# Patient Record
Sex: MALE | Race: WHITE | NOT HISPANIC OR LATINO | Employment: OTHER | RURAL
[De-identification: names, ages, dates, MRNs, and addresses within clinical notes are randomized per-mention and may not be internally consistent; named-entity substitution may affect disease eponyms.]

---

## 2021-05-09 ENCOUNTER — OFFICE VISIT (OUTPATIENT)
Dept: FAMILY MEDICINE | Facility: CLINIC | Age: 56
End: 2021-05-09
Payer: COMMERCIAL

## 2021-05-09 VITALS
BODY MASS INDEX: 31.08 KG/M2 | OXYGEN SATURATION: 98 % | HEIGHT: 69 IN | DIASTOLIC BLOOD PRESSURE: 79 MMHG | RESPIRATION RATE: 17 BRPM | HEART RATE: 60 BPM | TEMPERATURE: 97 F | WEIGHT: 209.81 LBS | SYSTOLIC BLOOD PRESSURE: 119 MMHG

## 2021-05-09 DIAGNOSIS — Z80.0 FAMILY HX OF COLON CANCER: ICD-10-CM

## 2021-05-09 DIAGNOSIS — K59.00 CONSTIPATION, UNSPECIFIED CONSTIPATION TYPE: Primary | ICD-10-CM

## 2021-05-09 DIAGNOSIS — R10.9 ABDOMINAL DISCOMFORT: ICD-10-CM

## 2021-05-09 PROBLEM — M51.37 DEGENERATION OF LUMBAR OR LUMBOSACRAL INTERVERTEBRAL DISC: Status: ACTIVE | Noted: 2021-05-09

## 2021-05-09 PROBLEM — M54.50 LOW BACK PAIN: Status: ACTIVE | Noted: 2021-05-09

## 2021-05-09 PROCEDURE — 99202 PR OFFICE/OUTPT VISIT, NEW, LEVL II, 15-29 MIN: ICD-10-PCS | Mod: ,,, | Performed by: NURSE PRACTITIONER

## 2021-05-09 PROCEDURE — 99051 MED SERV EVE/WKEND/HOLIDAY: CPT | Mod: ,,, | Performed by: NURSE PRACTITIONER

## 2021-05-09 PROCEDURE — 99202 OFFICE O/P NEW SF 15 MIN: CPT | Mod: ,,, | Performed by: NURSE PRACTITIONER

## 2021-05-09 PROCEDURE — 99051 PR MEDICAL SERVICES, EVE/WKEND/HOLIDAY: ICD-10-PCS | Mod: ,,, | Performed by: NURSE PRACTITIONER

## 2021-05-11 DIAGNOSIS — Z12.11 COLON CANCER SCREENING: Primary | ICD-10-CM

## 2023-11-22 ENCOUNTER — HOSPITAL ENCOUNTER (EMERGENCY)
Facility: HOSPITAL | Age: 58
Discharge: HOME | End: 2023-11-22
Attending: EMERGENCY MEDICINE
Payer: OTHER GOVERNMENT

## 2023-11-22 VITALS
SYSTOLIC BLOOD PRESSURE: 168 MMHG | WEIGHT: 190 LBS | OXYGEN SATURATION: 96 % | TEMPERATURE: 99 F | DIASTOLIC BLOOD PRESSURE: 104 MMHG | RESPIRATION RATE: 10 BRPM | HEART RATE: 79 BPM | BODY MASS INDEX: 28.14 KG/M2 | HEIGHT: 69 IN

## 2023-11-22 DIAGNOSIS — I10 HYPERTENSION, UNSPECIFIED TYPE: ICD-10-CM

## 2023-11-22 DIAGNOSIS — M79.661 PAIN AND SWELLING OF RIGHT LOWER LEG: ICD-10-CM

## 2023-11-22 DIAGNOSIS — M79.89 PAIN AND SWELLING OF RIGHT LOWER LEG: ICD-10-CM

## 2023-11-22 DIAGNOSIS — R06.00 DYSPNEA: ICD-10-CM

## 2023-11-22 DIAGNOSIS — I82.4Y1 ACUTE DEEP VEIN THROMBOSIS (DVT) OF PROXIMAL VEIN OF RIGHT LOWER EXTREMITY: Primary | ICD-10-CM

## 2023-11-22 LAB
ALBUMIN SERPL BCP-MCNC: 3.6 G/DL (ref 3.5–5.2)
ALP SERPL-CCNC: 400 U/L (ref 55–135)
ALT SERPL W/O P-5'-P-CCNC: 176 U/L (ref 10–44)
ANION GAP SERPL CALC-SCNC: 16 MMOL/L (ref 8–16)
AST SERPL-CCNC: 137 U/L (ref 10–40)
BACTERIA #/AREA URNS HPF: NORMAL /HPF
BASOPHILS # BLD AUTO: 0.02 K/UL (ref 0–0.2)
BASOPHILS NFR BLD: 0.4 % (ref 0–1.9)
BILIRUB SERPL-MCNC: 5.3 MG/DL (ref 0.1–1)
BILIRUB UR QL STRIP: ABNORMAL
BUN SERPL-MCNC: 21 MG/DL (ref 6–20)
CALCIUM SERPL-MCNC: 9.8 MG/DL (ref 8.7–10.5)
CHLORIDE SERPL-SCNC: 99 MMOL/L (ref 95–110)
CLARITY UR: CLEAR
CO2 SERPL-SCNC: 24 MMOL/L (ref 23–29)
COLOR UR: ABNORMAL
CREAT SERPL-MCNC: 1.9 MG/DL (ref 0.5–1.4)
D DIMER PPP IA.FEU-MCNC: 5.89 MG/L FEU
DIFFERENTIAL METHOD: ABNORMAL
EOSINOPHIL # BLD AUTO: 0.1 K/UL (ref 0–0.5)
EOSINOPHIL NFR BLD: 1.3 % (ref 0–8)
ERYTHROCYTE [DISTWIDTH] IN BLOOD BY AUTOMATED COUNT: 13.6 % (ref 11.5–14.5)
EST. GFR  (NO RACE VARIABLE): 40.4 ML/MIN/1.73 M^2
GLUCOSE SERPL-MCNC: 197 MG/DL (ref 70–110)
GLUCOSE UR QL STRIP: ABNORMAL
HCT VFR BLD AUTO: 41.4 % (ref 40–54)
HCV AB SERPL QL IA: NORMAL
HGB BLD-MCNC: 13.8 G/DL (ref 14–18)
HGB UR QL STRIP: ABNORMAL
HIV 1+2 AB+HIV1 P24 AG SERPL QL IA: NORMAL
IMM GRANULOCYTES # BLD AUTO: 0.04 K/UL (ref 0–0.04)
IMM GRANULOCYTES NFR BLD AUTO: 0.7 % (ref 0–0.5)
INFLUENZA A, MOLECULAR: NEGATIVE
INFLUENZA B, MOLECULAR: NEGATIVE
INR PPP: 1 (ref 0.8–1.2)
KETONES UR QL STRIP: ABNORMAL
LEUKOCYTE ESTERASE UR QL STRIP: ABNORMAL
LYMPHOCYTES # BLD AUTO: 0.4 K/UL (ref 1–4.8)
LYMPHOCYTES NFR BLD: 8 % (ref 18–48)
MCH RBC QN AUTO: 30.1 PG (ref 27–31)
MCHC RBC AUTO-ENTMCNC: 33.3 G/DL (ref 32–36)
MCV RBC AUTO: 90 FL (ref 82–98)
MICROSCOPIC COMMENT: NORMAL
MONOCYTES # BLD AUTO: 0.5 K/UL (ref 0.3–1)
MONOCYTES NFR BLD: 9.6 % (ref 4–15)
NEUTROPHILS # BLD AUTO: 4.4 K/UL (ref 1.8–7.7)
NEUTROPHILS NFR BLD: 80 % (ref 38–73)
NITRITE UR QL STRIP: ABNORMAL
NRBC BLD-RTO: 0 /100 WBC
PH UR STRIP: ABNORMAL [PH] (ref 5–8)
PLATELET # BLD AUTO: 234 K/UL (ref 150–450)
PMV BLD AUTO: 10.6 FL (ref 9.2–12.9)
POTASSIUM SERPL-SCNC: 3.5 MMOL/L (ref 3.5–5.1)
PROT SERPL-MCNC: 7.4 G/DL (ref 6–8.4)
PROT UR QL STRIP: ABNORMAL
PROTHROMBIN TIME: 10.7 SEC (ref 9–12.5)
RBC # BLD AUTO: 4.58 M/UL (ref 4.6–6.2)
RBC #/AREA URNS HPF: 1 /HPF (ref 0–4)
SARS-COV-2 RDRP RESP QL NAA+PROBE: NEGATIVE
SODIUM SERPL-SCNC: 139 MMOL/L (ref 136–145)
SP GR UR STRIP: ABNORMAL (ref 1–1.03)
SPECIMEN SOURCE: NORMAL
TROPONIN I SERPL DL<=0.01 NG/ML-MCNC: <0.006 NG/ML (ref 0–0.03)
URN SPEC COLLECT METH UR: ABNORMAL
UROBILINOGEN UR STRIP-ACNC: ABNORMAL EU/DL
WBC # BLD AUTO: 5.51 K/UL (ref 3.9–12.7)
WBC #/AREA URNS HPF: 3 /HPF (ref 0–5)

## 2023-11-22 PROCEDURE — 80053 COMPREHEN METABOLIC PANEL: CPT | Performed by: EMERGENCY MEDICINE

## 2023-11-22 PROCEDURE — 87389 HIV-1 AG W/HIV-1&-2 AB AG IA: CPT | Performed by: EMERGENCY MEDICINE

## 2023-11-22 PROCEDURE — 85025 COMPLETE CBC W/AUTO DIFF WBC: CPT | Performed by: EMERGENCY MEDICINE

## 2023-11-22 PROCEDURE — 71046 X-RAY EXAM CHEST 2 VIEWS: CPT | Mod: TC

## 2023-11-22 PROCEDURE — 85379 FIBRIN DEGRADATION QUANT: CPT | Performed by: EMERGENCY MEDICINE

## 2023-11-22 PROCEDURE — 71046 XR CHEST PA AND LATERAL: ICD-10-PCS | Mod: 26,ICN,, | Performed by: RADIOLOGY

## 2023-11-22 PROCEDURE — U0002 COVID-19 LAB TEST NON-CDC: HCPCS | Performed by: EMERGENCY MEDICINE

## 2023-11-22 PROCEDURE — 93971 US LOWER EXTREMITY VEINS RIGHT: ICD-10-PCS | Mod: 26,RT,ICN, | Performed by: RADIOLOGY

## 2023-11-22 PROCEDURE — 93971 EXTREMITY STUDY: CPT | Mod: 26,RT,ICN, | Performed by: RADIOLOGY

## 2023-11-22 PROCEDURE — 93010 EKG 12-LEAD: ICD-10-PCS | Mod: ICN,,, | Performed by: INTERNAL MEDICINE

## 2023-11-22 PROCEDURE — 71046 X-RAY EXAM CHEST 2 VIEWS: CPT | Mod: 26,ICN,, | Performed by: RADIOLOGY

## 2023-11-22 PROCEDURE — 81000 URINALYSIS NONAUTO W/SCOPE: CPT | Performed by: EMERGENCY MEDICINE

## 2023-11-22 PROCEDURE — 93005 ELECTROCARDIOGRAM TRACING: CPT

## 2023-11-22 PROCEDURE — 86803 HEPATITIS C AB TEST: CPT | Performed by: EMERGENCY MEDICINE

## 2023-11-22 PROCEDURE — 84484 ASSAY OF TROPONIN QUANT: CPT | Performed by: EMERGENCY MEDICINE

## 2023-11-22 PROCEDURE — 71275 CT ANGIOGRAPHY CHEST: CPT | Mod: TC

## 2023-11-22 PROCEDURE — 93971 EXTREMITY STUDY: CPT | Mod: TC,RT

## 2023-11-22 PROCEDURE — 96360 HYDRATION IV INFUSION INIT: CPT

## 2023-11-22 PROCEDURE — 71275 CT ANGIOGRAPHY CHEST: CPT | Mod: 26,,, | Performed by: RADIOLOGY

## 2023-11-22 PROCEDURE — 99285 EMERGENCY DEPT VISIT HI MDM: CPT | Mod: 25

## 2023-11-22 PROCEDURE — 25500020 PHARM REV CODE 255: Performed by: EMERGENCY MEDICINE

## 2023-11-22 PROCEDURE — 25000003 PHARM REV CODE 250: Performed by: EMERGENCY MEDICINE

## 2023-11-22 PROCEDURE — 71275 CTA CHEST NON CORONARY (PE STUDIES): ICD-10-PCS | Mod: 26,,, | Performed by: RADIOLOGY

## 2023-11-22 PROCEDURE — 93010 ELECTROCARDIOGRAM REPORT: CPT | Mod: ICN,,, | Performed by: INTERNAL MEDICINE

## 2023-11-22 PROCEDURE — 85610 PROTHROMBIN TIME: CPT | Performed by: EMERGENCY MEDICINE

## 2023-11-22 PROCEDURE — 87502 INFLUENZA DNA AMP PROBE: CPT | Performed by: EMERGENCY MEDICINE

## 2023-11-22 RX ORDER — AMLODIPINE BESYLATE 10 MG/1
10 TABLET ORAL DAILY
Qty: 14 TABLET | Refills: 0 | Status: SHIPPED | OUTPATIENT
Start: 2023-11-22 | End: 2023-12-06

## 2023-11-22 RX ORDER — CHOLECALCIFEROL (VITAMIN D3) 25 MCG
50000 TABLET ORAL WEEKLY
COMMUNITY

## 2023-11-22 RX ORDER — AMLODIPINE BESYLATE 2.5 MG/1
10 TABLET ORAL
Status: COMPLETED | OUTPATIENT
Start: 2023-11-22 | End: 2023-11-22

## 2023-11-22 RX ORDER — CEPHALEXIN 500 MG/1
500 CAPSULE ORAL EVERY 6 HOURS
Status: ON HOLD | COMMUNITY
End: 2023-12-17 | Stop reason: HOSPADM

## 2023-11-22 RX ORDER — TAMSULOSIN HYDROCHLORIDE 0.4 MG/1
CAPSULE ORAL DAILY
COMMUNITY

## 2023-11-22 RX ORDER — AMLODIPINE BESYLATE 10 MG/1
10 TABLET ORAL DAILY
COMMUNITY

## 2023-11-22 RX ADMIN — IOHEXOL 75 ML: 350 INJECTION, SOLUTION INTRAVENOUS at 05:11

## 2023-11-22 RX ADMIN — AMLODIPINE BESYLATE 10 MG: 2.5 TABLET ORAL at 06:11

## 2023-11-22 RX ADMIN — APIXABAN 10 MG: 2.5 TABLET, FILM COATED ORAL at 06:11

## 2023-11-22 RX ADMIN — SODIUM CHLORIDE 1000 ML: 9 INJECTION, SOLUTION INTRAVENOUS at 04:11

## 2023-11-22 NOTE — ED PROVIDER NOTES
"Encounter Date: 11/22/2023       History     Chief Complaint   Patient presents with    Shortness of Breath     Patient reports dyspnea x 2-3 weeks. Relays a Hx of PE . Rt leg swelling Pain from Rt groin to Rt foot instep.      58-year-old male, here from home via private vehicle with several complaints.  He states that for the past 2-3 weeks he has been having some mild dyspnea.  Also reports pain on the inner aspect of the right thigh extending into the right mid calf region, with mild swelling of the leg.  The leg symptoms have been present for several days.  Pain is made worse by ambulation and position.  He also noticed some dark colored urine today.  He states he was recently [facility] with an acute kidney injury thought to be caused by "clogged ureter".  He does not know which side was affected.  Patient denies fever or chills.  Denies traumatic injury.  [facility].  [facility] and patient was told that he needed to be seen in an ER for further evaluation.  Patient does have a history of DVT and PE but this was many years ago.      Review of patient's allergies indicates:   Allergen Reactions    Codeine Nausea And Vomiting     Past Medical History:   Diagnosis Date    Acute kidney injury     DVT (deep venous thrombosis)     Hypertension     Rectal cancer      Past Surgical History:   Procedure Laterality Date    BACK SURGERY      COLOSTOMY      NECK SURGERY      SPINE SURGERY       History reviewed. No pertinent family history.  Social History     Tobacco Use    Smoking status: Smoker, Current Status Unknown   Substance Use Topics    Alcohol use: Yes    Drug use: Never     Review of Systems   Constitutional: Negative.    HENT: Negative.     Eyes: Negative.    Respiratory:  Positive for shortness of breath.    Cardiovascular:  Positive for leg swelling (right).   Endocrine: Negative.    Genitourinary: Negative.    Musculoskeletal:  Positive " for arthralgias (right leg pain).   Skin: Negative.    Allergic/Immunologic: Negative.    Neurological: Negative.    Psychiatric/Behavioral: Negative.         Physical Exam     Initial Vitals [11/22/23 1228]   BP Pulse Resp Temp SpO2   (!) 117/92 95 18 98.8 °F (37.1 °C) 98 %      MAP       --         Physical Exam    Nursing note and vitals reviewed.  Constitutional: He appears well-developed and well-nourished. No distress.   No acute distress, vital signs are stable, O2 saturations are 99% on room air.  Breathing is unlabored.   HENT:   Head: Normocephalic and atraumatic.   Nose: Nose normal.   Eyes: Conjunctivae and EOM are normal. Pupils are equal, round, and reactive to light. No scleral icterus.   Neck: Neck supple. No JVD present.   Normal range of motion.  Cardiovascular:  Normal rate, regular rhythm, normal heart sounds and intact distal pulses.           No murmur heard.  Pulmonary/Chest: Breath sounds normal. No stridor. No respiratory distress. He has no wheezes. He has no rhonchi. He has no rales.   Abdominal: Abdomen is soft. Bowel sounds are normal. He exhibits no distension. There is no abdominal tenderness.   Musculoskeletal:         General: Tenderness and edema present. Normal range of motion.      Cervical back: Normal range of motion and neck supple.      Comments: Examination of the patient's right leg reveals very mild edema of the ankle.  There is no discoloration.  Compartments are soft.  No palpable cords are noted.  He does have some mild tenderness extending from the right groin along the path of the sartorius muscle to the knee, and slightly beyond.  He states sometimes the pain extends further down the leg.  No obvious traumatic injuries.  No crepitus with range of motion of the knee, hip, ankle, or toes.  Normal sensory function throughout the leg.  The leg is not pale, nor is it erythematous.  Skin is warm and temperature is the same as the left leg and foot.  Pulses in the foot are  somewhat difficult palpate however.     Neurological: He is alert and oriented to person, place, and time. He has normal strength. No cranial nerve deficit or sensory deficit. GCS score is 15. GCS eye subscore is 4. GCS verbal subscore is 5. GCS motor subscore is 6.   Skin: Skin is warm and dry. Capillary refill takes less than 2 seconds. No rash noted. No erythema.   Psychiatric: He has a normal mood and affect. His behavior is normal.         ED Course   Procedures  Labs Reviewed   CBC W/ AUTO DIFFERENTIAL - Abnormal; Notable for the following components:       Result Value    RBC 4.58 (*)     Hemoglobin 13.8 (*)     Immature Granulocytes 0.7 (*)     Lymph # 0.4 (*)     Gran % 80.0 (*)     Lymph % 8.0 (*)     All other components within normal limits   COMPREHENSIVE METABOLIC PANEL - Abnormal; Notable for the following components:    Glucose 197 (*)     BUN 21 (*)     Creatinine 1.9 (*)     Total Bilirubin 5.3 (*)     Alkaline Phosphatase 400 (*)      (*)      (*)     eGFR 40.4 (*)     All other components within normal limits   URINALYSIS, REFLEX TO URINE CULTURE - Abnormal; Notable for the following components:    Color, UA Orange (*)     All other components within normal limits    Narrative:     Preferred Collection Type->Urine, Clean Catch  Specimen Source->Urine   D DIMER, QUANTITATIVE - Abnormal; Notable for the following components:    D-Dimer 5.89 (*)     All other components within normal limits   INFLUENZA A & B BY MOLECULAR   PROTIME-INR   SARS-COV-2 RNA AMPLIFICATION, QUAL    Narrative:     Is the patient symptomatic?->No   TROPONIN I   URINALYSIS MICROSCOPIC    Narrative:     Preferred Collection Type->Urine, Clean Catch  Specimen Source->Urine   HIV 1 / 2 ANTIBODY   HEPATITIS C ANTIBODY     EKG Readings: (Independently Interpreted)   EKG personally reviewed by me shows normal sinus rhythm at 77 beats per minute.  MT interval 180, .  No ST elevation or depression, no T-wave  change, no strain pattern       Imaging Results              CTA Chest Non-Coronary (PE Studies) (Final result)  Result time 11/22/23 17:48:19      Final result by Mike Novak MD (11/22/23 17:48:19)                   Impression:      No central or segmental pulmonary embolus.    Additional findings, as above.      Electronically signed by: Mike Novak  Date:    11/22/2023  Time:    17:48               Narrative:    EXAMINATION:  CTA CHEST NON CORONARY (PE STUDIES)    CLINICAL HISTORY:  Pulmonary embolism (PE) suspected, positive D-dimer;    TECHNIQUE:  Low dose axial images, sagittal and coronal reformations were obtained from the thoracic inlet to the lung bases following the IV administration of 75 mL of Omnipaque 350.  Contrast timing was optimized to evaluate the pulmonary arteries.  MIP images were performed.    COMPARISON:  11/22/2023    FINDINGS:  Punctate calcified granuloma in the left lower lobe.  No suspicious pulmonary nodules or masses.  No focal consolidation, pleural effusion, or pneumothorax.  Minimal scarring or atelectasis in the lingula.    No central or segmental pulmonary embolus.  No cardiomegaly.    Bilateral gynecomastia.    Gallbladder sludge.    Osteopenia and multilevel degenerative change of the spine with diffuse idiopathic skeletal hyperostosis.                                        US Lower Extremity Veins Right (Final result)  Result time 11/22/23 15:04:10   Procedure changed from US Lower Extremity Arteries Right     Final result by Latanya Marie MD (11/22/23 15:04:10)                   Impression:      Nonocclusive DVT in the common femoral vein as well as superficial thrombophlebitis of the proximal greater saphenous vein.    Right inguinal adenopathy which may be reactive.    This report was flagged in Epic as abnormal.      Electronically signed by: Latanya Marie  Date:    11/22/2023  Time:    15:04               Narrative:    EXAMINATION:  US  LOWER EXTREMITY VEINS RIGHT    CLINICAL HISTORY:  leg swelling; Pain in right lower leg    TECHNIQUE:  Duplex and color flow Doppler evaluation and graded compression of the right lower extremity veins was performed.    COMPARISON:  None    FINDINGS:  Right thigh veins: There is nonocclusive DVT in the common femoral vein and in the proximal saphenous vein.  The superficial femoral, popliteal, and deep femoral veins are patent and free of thrombus. The veins are normally compressible and have normal phasic flow and augmentation response.    Right calf veins: The visualized calf veins are patent.    Contralateral CFV: The contralateral (left) common femoral vein is patent and free of thrombus.    Miscellaneous: There are enlarged inguinal lymph nodes with short axis diameters of up to 1.2 cm.                                       X-Ray Chest PA And Lateral (Final result)  Result time 11/22/23 14:03:25      Final result by Latanya Marie MD (11/22/23 14:03:25)                   Impression:      5 mm nodular density projecting over the right lung base.  This may be a calcified granuloma, but in the absence of previous films for comparison, follow-up chest CT recommended on a nonemergent basis.      Electronically signed by: Latanya Marie  Date:    11/22/2023  Time:    14:03               Narrative:    EXAMINATION:  XR CHEST PA AND LATERAL    CLINICAL HISTORY:  Dyspnea, unspecified    TECHNIQUE:  PA and lateral views of the chest were performed.    COMPARISON:  None    FINDINGS:  Cardiomediastinal silhouette within normal limits.  Right-sided medication port is present.  Lungs are clear aside from a 5 mm nodular density at the right lung base.  There is no pleural effusion.  There is hardware in the cervical spine.                                    X-Rays:   Independently Interpreted Readings:   Other Readings:  Right leg ultrasound, personally reviewed by me shows nonocclusive DVT in the common femoral  vein in in the proximal saphenous vein.  The superficial femoral, popliteal, and deep femoral veins are patent and free of thrombus.  Radiology also describes some superficial thrombophlebitis of the proximal greater saphenous vein and there is some right inguinal adenopathy.     Chest x-ray, personally reviewed by me shows right-sided medication port.  Lungs are clear except for a 5 mm nodule in the right lung base.  No previous films for comparison.  Hardware in the cervical spine, otherwise skeletal structures are normal.  Cardiac silhouette normal.    CT chest, PE protocol, shows no evidence of pulmonary embolus.    Medications   amLODIPine tablet 10 mg (has no administration in time range)   apixaban tablet 10 mg (has no administration in time range)   sodium chloride 0.9% bolus 1,000 mL 1,000 mL (1,000 mLs Intravenous New Bag 11/22/23 1625)   iohexoL (OMNIPAQUE 350) injection 75 mL (75 mLs Intravenous Given 11/22/23 1719)     Medical Decision Making  Differential includes pulmonary embolus, DVT, other vascular occlusion, viral illness, muscle strain, etc.    Labs were ordered, and an ultrasound of the right lower extremity was performed.  Patient does have nonocclusive DVTs in the right lower extremity.  Patient also complained of some mild dyspnea, so a CT of the chest was performed.  This CT, personally reviewed by me showed no evidence of PE.    Patient has an elevated BUN and creatinine, but this appears to be very near his baseline.  He has been given fluid rehydration here.  His blood pressure was also somewhat elevated, so he was given his customary dose of amlodipine.  He states he has been out of amlodipine for several days.  [facility] states [facility] but he does not know when it will arrive.     I believe patient is safe for discharge home.  Will prescribe Eliquis.  He will also be prescribed a short course amlodipine to bridge the gap between now and when his  prescriptions arrive in the mail.  Patient has also requested referral to a primary care provider.    Amount and/or Complexity of Data Reviewed  Labs: ordered.  Radiology: ordered.    Risk  Prescription drug management.                                   Clinical Impression:  Final diagnoses:  [M79.661, M79.89] Pain and swelling of right lower leg  [R06.00] Dyspnea  [I82.4Y1] Acute deep vein thrombosis (DVT) of proximal vein of right lower extremity (Primary)  [I10] Hypertension, unspecified type          ED Disposition Condition    Discharge Stable          ED Prescriptions       Medication Sig Dispense Start Date End Date Auth. Provider    apixaban (ELIQUIS) 5 mg Tab Take 2 tablets (10 mg total) by mouth 2 (two) times daily for 7 days, THEN 1 tablet (5 mg total) 2 (two) times daily for 21 days. 70 tablet 11/23/2023 12/21/2023 Calvin Tuttle MD    amLODIPine (NORVASC) 10 MG tablet Take 1 tablet (10 mg total) by mouth once daily. for 14 days 14 tablet 11/22/2023 12/6/2023 Calvin Tuttle MD          Follow-up Information    None          Calvin Tuttle MD  11/22/23 7547

## 2023-11-23 NOTE — DISCHARGE INSTRUCTIONS
Take amlodipine as prescribed for high blood pressure, and take Eliquis for DVT.  Take 10 mg twice daily for 7 days, then 5 mg twice daily thereafter.  Your prescription for the last approximately 1 month.  You will need to get refills from either your new primary care provider or your [facility] doctors.  Be sure to follow-up with 1 or the other within the next week to 10 days.  Return here for any worsening signs or symptoms.

## 2023-12-07 ENCOUNTER — HOSPITAL ENCOUNTER (EMERGENCY)
Facility: HOSPITAL | Age: 58
Discharge: HOME | End: 2023-12-07
Attending: STUDENT IN AN ORGANIZED HEALTH CARE EDUCATION/TRAINING PROGRAM
Payer: OTHER GOVERNMENT

## 2023-12-07 VITALS
DIASTOLIC BLOOD PRESSURE: 93 MMHG | HEIGHT: 69 IN | OXYGEN SATURATION: 96 % | SYSTOLIC BLOOD PRESSURE: 142 MMHG | HEART RATE: 82 BPM | RESPIRATION RATE: 16 BRPM | BODY MASS INDEX: 30.36 KG/M2 | WEIGHT: 205 LBS | TEMPERATURE: 98 F

## 2023-12-07 DIAGNOSIS — N43.3 HYDROCELE IN ADULT: ICD-10-CM

## 2023-12-07 DIAGNOSIS — I86.1 VARICOCELE: ICD-10-CM

## 2023-12-07 DIAGNOSIS — I82.5Y9 CHRONIC DEEP VEIN THROMBOSIS (DVT) OF PROXIMAL VEIN OF LOWER EXTREMITY, UNSPECIFIED LATERALITY: Primary | ICD-10-CM

## 2023-12-07 DIAGNOSIS — N50.89 SCROTAL EDEMA: ICD-10-CM

## 2023-12-07 DIAGNOSIS — M79.89 RIGHT LEG SWELLING: ICD-10-CM

## 2023-12-07 LAB
ALBUMIN SERPL BCP-MCNC: 3.4 G/DL (ref 3.5–5.2)
ALP SERPL-CCNC: 326 U/L (ref 55–135)
ALT SERPL W/O P-5'-P-CCNC: 20 U/L (ref 10–44)
ANION GAP SERPL CALC-SCNC: 13 MMOL/L (ref 8–16)
AST SERPL-CCNC: 16 U/L (ref 10–40)
BASOPHILS # BLD AUTO: 0.05 K/UL (ref 0–0.2)
BASOPHILS NFR BLD: 0.7 % (ref 0–1.9)
BILIRUB SERPL-MCNC: 0.7 MG/DL (ref 0.1–1)
BILIRUB UR QL STRIP: NEGATIVE
BUN SERPL-MCNC: 23 MG/DL (ref 6–20)
CALCIUM SERPL-MCNC: 9.3 MG/DL (ref 8.7–10.5)
CHLORIDE SERPL-SCNC: 104 MMOL/L (ref 95–110)
CLARITY UR: CLEAR
CO2 SERPL-SCNC: 24 MMOL/L (ref 23–29)
COLOR UR: YELLOW
CREAT SERPL-MCNC: 2 MG/DL (ref 0.5–1.4)
D DIMER PPP IA.FEU-MCNC: 6.53 MG/L FEU
DIFFERENTIAL METHOD: ABNORMAL
EOSINOPHIL # BLD AUTO: 0.1 K/UL (ref 0–0.5)
EOSINOPHIL NFR BLD: 1.7 % (ref 0–8)
ERYTHROCYTE [DISTWIDTH] IN BLOOD BY AUTOMATED COUNT: 13.4 % (ref 11.5–14.5)
EST. GFR  (NO RACE VARIABLE): 38 ML/MIN/1.73 M^2
GLUCOSE SERPL-MCNC: 195 MG/DL (ref 70–110)
GLUCOSE UR QL STRIP: NEGATIVE
HCT VFR BLD AUTO: 38.9 % (ref 40–54)
HGB BLD-MCNC: 12.9 G/DL (ref 14–18)
HGB UR QL STRIP: ABNORMAL
IMM GRANULOCYTES # BLD AUTO: 0.05 K/UL (ref 0–0.04)
IMM GRANULOCYTES NFR BLD AUTO: 0.7 % (ref 0–0.5)
INR PPP: 1 (ref 0.8–1.2)
KETONES UR QL STRIP: NEGATIVE
LEUKOCYTE ESTERASE UR QL STRIP: NEGATIVE
LYMPHOCYTES # BLD AUTO: 0.4 K/UL (ref 1–4.8)
LYMPHOCYTES NFR BLD: 6 % (ref 18–48)
MCH RBC QN AUTO: 30.5 PG (ref 27–31)
MCHC RBC AUTO-ENTMCNC: 33.2 G/DL (ref 32–36)
MCV RBC AUTO: 92 FL (ref 82–98)
MONOCYTES # BLD AUTO: 0.5 K/UL (ref 0.3–1)
MONOCYTES NFR BLD: 6.8 % (ref 4–15)
NEUTROPHILS # BLD AUTO: 5.8 K/UL (ref 1.8–7.7)
NEUTROPHILS NFR BLD: 84.1 % (ref 38–73)
NITRITE UR QL STRIP: NEGATIVE
NRBC BLD-RTO: 0 /100 WBC
PH UR STRIP: 6 [PH] (ref 5–8)
PLATELET # BLD AUTO: 211 K/UL (ref 150–450)
PMV BLD AUTO: 11.2 FL (ref 9.2–12.9)
POTASSIUM SERPL-SCNC: 3.8 MMOL/L (ref 3.5–5.1)
PROT SERPL-MCNC: 7 G/DL (ref 6–8.4)
PROT UR QL STRIP: NEGATIVE
PROTHROMBIN TIME: 11 SEC (ref 9–12.5)
RBC # BLD AUTO: 4.23 M/UL (ref 4.6–6.2)
SODIUM SERPL-SCNC: 141 MMOL/L (ref 136–145)
SP GR UR STRIP: 1.02 (ref 1–1.03)
URN SPEC COLLECT METH UR: ABNORMAL
UROBILINOGEN UR STRIP-ACNC: NEGATIVE EU/DL
WBC # BLD AUTO: 6.89 K/UL (ref 3.9–12.7)

## 2023-12-07 PROCEDURE — 25000003 PHARM REV CODE 250: Performed by: EMERGENCY MEDICINE

## 2023-12-07 PROCEDURE — 76870 US EXAM SCROTUM: CPT | Mod: 26,,, | Performed by: RADIOLOGY

## 2023-12-07 PROCEDURE — 93971 US LOWER EXTREMITY VEINS RIGHT: ICD-10-PCS | Mod: 26,RT,, | Performed by: RADIOLOGY

## 2023-12-07 PROCEDURE — 85379 FIBRIN DEGRADATION QUANT: CPT | Performed by: STUDENT IN AN ORGANIZED HEALTH CARE EDUCATION/TRAINING PROGRAM

## 2023-12-07 PROCEDURE — 93971 EXTREMITY STUDY: CPT | Mod: 26,RT,, | Performed by: RADIOLOGY

## 2023-12-07 PROCEDURE — 81003 URINALYSIS AUTO W/O SCOPE: CPT | Performed by: STUDENT IN AN ORGANIZED HEALTH CARE EDUCATION/TRAINING PROGRAM

## 2023-12-07 PROCEDURE — 93971 EXTREMITY STUDY: CPT | Mod: TC,RT

## 2023-12-07 PROCEDURE — 85025 COMPLETE CBC W/AUTO DIFF WBC: CPT | Performed by: STUDENT IN AN ORGANIZED HEALTH CARE EDUCATION/TRAINING PROGRAM

## 2023-12-07 PROCEDURE — 76870 US EXAM SCROTUM: CPT | Mod: TC

## 2023-12-07 PROCEDURE — 99284 EMERGENCY DEPT VISIT MOD MDM: CPT | Mod: 25

## 2023-12-07 PROCEDURE — 80053 COMPREHEN METABOLIC PANEL: CPT | Performed by: STUDENT IN AN ORGANIZED HEALTH CARE EDUCATION/TRAINING PROGRAM

## 2023-12-07 PROCEDURE — 85610 PROTHROMBIN TIME: CPT | Performed by: STUDENT IN AN ORGANIZED HEALTH CARE EDUCATION/TRAINING PROGRAM

## 2023-12-07 PROCEDURE — 76870 US SCROTUM AND TESTICLES: ICD-10-PCS | Mod: 26,,, | Performed by: RADIOLOGY

## 2023-12-07 RX ORDER — HYDROCODONE BITARTRATE AND ACETAMINOPHEN 10; 325 MG/1; MG/1
1 TABLET ORAL
Status: COMPLETED | OUTPATIENT
Start: 2023-12-07 | End: 2023-12-07

## 2023-12-07 RX ORDER — ONDANSETRON 4 MG/1
4 TABLET, ORALLY DISINTEGRATING ORAL
Status: COMPLETED | OUTPATIENT
Start: 2023-12-07 | End: 2023-12-07

## 2023-12-07 RX ORDER — OXYCODONE AND ACETAMINOPHEN 7.5; 325 MG/1; MG/1
1 TABLET ORAL EVERY 6 HOURS PRN
Qty: 20 TABLET | Refills: 0 | Status: SHIPPED | OUTPATIENT
Start: 2023-12-07

## 2023-12-07 RX ORDER — ONDANSETRON 4 MG/1
4 TABLET, ORALLY DISINTEGRATING ORAL EVERY 6 HOURS PRN
Qty: 20 TABLET | Refills: 0 | Status: SHIPPED | OUTPATIENT
Start: 2023-12-07

## 2023-12-07 RX ADMIN — ONDANSETRON 4 MG: 4 TABLET, ORALLY DISINTEGRATING ORAL at 07:12

## 2023-12-07 RX ADMIN — HYDROCODONE BITARTRATE AND ACETAMINOPHEN 1 TABLET: 10; 325 TABLET ORAL at 07:12

## 2023-12-07 NOTE — LETTER
Patient: Chase De La O  YOB: 1965  Date: 12/7/2023 Time: 1:58 PM  Location: [facility]    Leaving the [facility] Against Medical Advice    Chart #:[id number]    This will certify that I, the undersigned,    ______________________________________________________________________    A patient in the above named [facility] center, having requested discharge and removal from the medical [place] against the advice of my attending physician(s), hereby release [facility], its physicians, officers and employees, severally and individually, from any and all liability of any nature whatsoever for any injury or harm or complication of any kind that may result directly or indirectly, by reason of my terminating my stay as a patient at [facility] and my departure from [facility], and hereby waive any and all rights of action I may now have or later acquire as a result of my voluntary departure from [facility] and the termination of my stay as a patient therein.    This release is made with the full knowledge of the danger that may result from the action which I am taking.      Date:_______________________                         ___________________________                                                                                    Patient/Legal Representative    Witness:        ____________________________                          ___________________________  Nurse                                                                        Physician

## 2023-12-07 NOTE — ED PROVIDER NOTES
Encounter Date: 12/7/2023       History     Chief Complaint   Patient presents with    Groin Swelling    Leg Swelling     58-year-old male with history of hypertension, TEJAS, rectal cancer, also found to have DVT of right lower extremity 2 weeks. He presents to ED with complaints of worsening swelling to right lower extremity as well as groin in his scrotal swelling. He denies increased shortness of breath, chest pain, palpitations. He tells me that he has been compliant with Eliquis 5 mg b.i.d. as prescribed.  He denies associated fever, chills, recent changes in urinary habits.    The history is provided by the patient. No  was used.     Review of patient's allergies indicates:   Allergen Reactions    Codeine Nausea And Vomiting     Past Medical History:   Diagnosis Date    Acute kidney injury     DVT (deep venous thrombosis)     Hypertension     Rectal cancer      Past Surgical History:   Procedure Laterality Date    BACK SURGERY      COLOSTOMY      NECK SURGERY      SPINE SURGERY       No family history on file.  Social History     Tobacco Use    Smoking status: Smoker, Current Status Unknown   Substance Use Topics    Alcohol use: Yes    Drug use: Never     Review of Systems   Constitutional: Negative.    HENT: Negative.     Eyes: Negative.    Respiratory: Negative.     Cardiovascular:  Positive for leg swelling.   Gastrointestinal: Negative.    Genitourinary: Negative.    Musculoskeletal: Negative.    Skin: Negative.    Allergic/Immunologic: Negative.    Neurological: Negative.    Hematological: Negative.    Psychiatric/Behavioral: Negative.     All other systems reviewed and are negative.      Physical Exam     Initial Vitals [12/07/23 1243]   BP Pulse Resp Temp SpO2   133/87 90 18 98.2 °F (36.8 °C) 96 %      MAP       --         Physical Exam    Nursing note and vitals reviewed.  Constitutional: He appears well-developed.   HENT:   Head: Normocephalic.   Eyes: EOM are normal. Pupils are  equal, round, and reactive to light.   Neck: No JVD present.   Normal range of motion.  Cardiovascular:  Normal rate.           Pulmonary/Chest: Breath sounds normal. No respiratory distress. He has no wheezes. He has no rhonchi. He has no rales. He exhibits no tenderness.   Abdominal: Abdomen is soft. Bowel sounds are normal. He exhibits no distension. There is abdominal tenderness (b/l groin).   Musculoskeletal:         General: Normal range of motion.      Cervical back: Normal range of motion.     Lymphadenopathy:     He has no cervical adenopathy.   Neurological: He is alert and oriented to person, place, and time. He has normal strength. No cranial nerve deficit. GCS score is 15. GCS eye subscore is 4. GCS verbal subscore is 5. GCS motor subscore is 6.   Skin: Skin is warm. Capillary refill takes less than 2 seconds.   Diffuse swelling right lower extremity, groin, scrotum  no phlegmasia alba or Cerulea Dolens         ED Course   Procedures  Labs Reviewed   URINALYSIS, REFLEX TO URINE CULTURE - Abnormal; Notable for the following components:       Result Value    Occult Blood UA Trace (*)     All other components within normal limits    Narrative:     Preferred Collection Type->Urine, Clean Catch  Specimen Source->Urine   CBC W/ AUTO DIFFERENTIAL - Abnormal; Notable for the following components:    RBC 4.23 (*)     Hemoglobin 12.9 (*)     Hematocrit 38.9 (*)     Immature Granulocytes 0.7 (*)     Immature Grans (Abs) 0.05 (*)     Lymph # 0.4 (*)     Gran % 84.1 (*)     Lymph % 6.0 (*)     All other components within normal limits   COMPREHENSIVE METABOLIC PANEL - Abnormal; Notable for the following components:    Glucose 195 (*)     BUN 23 (*)     Creatinine 2.0 (*)     Albumin 3.4 (*)     Alkaline Phosphatase 326 (*)     eGFR 38.0 (*)     All other components within normal limits   D DIMER, QUANTITATIVE - Abnormal; Notable for the following components:    D-Dimer 6.53 (*)     All other components within normal  limits   PROTIME-INR          Imaging Results              US Scrotum And Testicles (Final result)  Result time 12/07/23 18:31:43      Final result by Mike Novak MD (12/07/23 18:31:43)                   Impression:      Large bilateral hydroceles.    Small bilateral varicoceles.    Scrotal swelling and edema.  No abscess.      Electronically signed by: Mike Novak  Date:    12/07/2023  Time:    18:31               Narrative:    EXAMINATION:  US SCROTUM AND TESTICLES    CLINICAL HISTORY:  Other specified disorders of the male genital organs    TECHNIQUE:  Sonography of the scrotum and testes.    COMPARISON:  None.    FINDINGS:  Right Testicle:    *Size: 4.1 x 2.7 x 2.7 cm  *Appearance: Normal.  *Flow: Within normal limits.  *Epididymis: Epididymal head cyst.  *Hydrocele: Large.  *Varicocele: Present.  .    Left Testicle:    *Size: 4.9 x 2.9 x 2.9 cm  *Appearance: Normal.  *Flow: Normal arterial and venous flow  *Epididymis: Epididymal head cyst.  Incidentally noted epididymal appendage  *Hydrocele: Large.  *Varicocele: Present.  .    Other findings: Scrotal swelling and edema.  No abscess.                                        US Lower Extremity Veins Right (Final result)  Result time 12/07/23 15:50:43      Final result by Zuhair Cuevas MD (12/07/23 15:50:43)                   Impression:      1. Persistent nonocclusive intraluminal thrombus at the junction of the common femoral vein and greater saphenous vein.  2. Enlarged right inguinal lymph nodes may be reactive in etiology.  Primary or secondary malignancy not excludable in this patient with reported history of rectal cancer.  This report was flagged in Epic as abnormal.      Electronically signed by: Zuhair Cuevas  Date:    12/07/2023  Time:    15:50               Narrative:    EXAMINATION:  US LOWER EXTREMITY VEINS RIGHT    CLINICAL HISTORY:  Other specified soft tissue disorders    TECHNIQUE:  Duplex and color flow Doppler  evaluation and graded compression of the right lower extremity veins was performed.    COMPARISON:  Ultrasound 11/22/2023.    FINDINGS:  Right thigh veins: There is persistent nonocclusive intraluminal thrombus at the junction of the common femoral vein and greater saphenous vein.  The remainder of the common femoral vein and greater saphenous vein are patent.  Femoral vein and popliteal vein patent without intraluminal thrombus.    Right calf veins: The visualized calf veins are patent.    Contralateral CFV: The contralateral (left) common femoral vein is patent and free of thrombus.    Miscellaneous: Enlarged lymph nodes of the right groin measuring 2.5 x 1.4 cm and 3.1 x 1.4 cm.                                       Medications   HYDROcodone-acetaminophen  mg per tablet 1 tablet (has no administration in time range)   ondansetron disintegrating tablet 4 mg (has no administration in time range)     Medical Decision Making  58-year-old male presenting with worsening swelling to right lower extremity as well as groin swelling scrotal edema.  Differential includes increased clot burden, lymphedema, others.  On exam there is no phlegmasia alba or Cerulea Dolens, there was no associated cardiopulmonary symptomology.  Repeat ultrasound shows persistent nonocclusive intraluminal thrombus at the junction of the common femoral vein and greater saphenous vein.   Patient requests ultrasound for scrotum as well. Patient was informed that scrotal swollen is likely secondary to dependent edema from DVT into elevate the scrotum in the seated position when laying down. He insists that he wants an ultrasound of the scrotum.  Scrotal ultrasound pending. I anticipate that patient be discharged home.   Case discussed with oncoming attending Dr. Love at shift change who will follow up with ultrasound of the scrotum, and disposition of the patient      Scrotal US showed hydroceles and varicoseles with edema. No abscess. He has  urology f/u.      Amount and/or Complexity of Data Reviewed  External Data Reviewed: labs and radiology.  Labs: ordered.  Radiology: ordered.               ED Course as of 12/07/23 1902   Thu Dec 07, 2023   1807 Signed out to me by Dr Farrell. Pt here with groin and leg swelling with known DVT. Plan was to f/u scrotal US and discharge. [DC]      ED Course User Index  [DC] Manolo Love Jr., MD                           Clinical Impression:  Final diagnoses:  [M79.89] Right leg swelling  [N50.89] Scrotal edema  [I82.5Y9] Chronic deep vein thrombosis (DVT) of proximal vein of lower extremity, unspecified laterality (Primary)  [N43.3] Hydrocele in adult  [I86.1] Varicocele          ED Disposition Condition    Discharge Stable          ED Prescriptions       Medication Sig Dispense Start Date End Date Auth. Provider    ondansetron (ZOFRAN-ODT) 4 MG TbDL Take 1 tablet (4 mg total) by mouth every 6 (six) hours as needed. 20 tablet 12/7/2023 -- Manolo Love Jr., MD    oxyCODONE-acetaminophen (PERCOCET) 7.5-325 mg per tablet Take 1 tablet by mouth every 6 (six) hours as needed for Pain. 20 tablet 12/7/2023 -- Manolo Love Jr., MD          Follow-up Information       Follow up With Specialties Details Why Contact Info    urology clinic  Schedule an appointment as soon as possible for a visit                Manolo Love Jr., MD  12/07/23 1902

## 2023-12-12 ENCOUNTER — HOSPITAL ENCOUNTER (INPATIENT)
Facility: HOSPITAL | Age: 58
LOS: 2 days | Discharge: ANOTHER HOSPITAL | DRG: 444 | End: 2023-12-15
Attending: EMERGENCY MEDICINE | Admitting: HOSPITALIST
Payer: OTHER GOVERNMENT

## 2023-12-12 DIAGNOSIS — K80.50 CHOLEDOCHOLITHIASIS: Primary | ICD-10-CM

## 2023-12-12 LAB
ALBUMIN SERPL BCP-MCNC: 3.3 G/DL (ref 3.5–5.2)
ALP SERPL-CCNC: 479 U/L (ref 55–135)
ALT SERPL W/O P-5'-P-CCNC: 128 U/L (ref 10–44)
ANION GAP SERPL CALC-SCNC: 17 MMOL/L (ref 8–16)
AST SERPL-CCNC: 119 U/L (ref 10–40)
BASOPHILS # BLD AUTO: 0.02 K/UL (ref 0–0.2)
BASOPHILS NFR BLD: 0.2 % (ref 0–1.9)
BILIRUB DIRECT SERPL-MCNC: 3.5 MG/DL (ref 0.1–0.3)
BILIRUB SERPL-MCNC: 4.4 MG/DL (ref 0.1–1)
BILIRUB UR QL STRIP: ABNORMAL
BUN SERPL-MCNC: 17 MG/DL (ref 6–20)
CALCIUM SERPL-MCNC: 10.1 MG/DL (ref 8.7–10.5)
CHLORIDE SERPL-SCNC: 101 MMOL/L (ref 95–110)
CLARITY UR: CLEAR
CO2 SERPL-SCNC: 21 MMOL/L (ref 23–29)
COLOR UR: YELLOW
CREAT SERPL-MCNC: 1.9 MG/DL (ref 0.5–1.4)
DIFFERENTIAL METHOD: ABNORMAL
EOSINOPHIL # BLD AUTO: 0 K/UL (ref 0–0.5)
EOSINOPHIL NFR BLD: 0.1 % (ref 0–8)
ERYTHROCYTE [DISTWIDTH] IN BLOOD BY AUTOMATED COUNT: 13.2 % (ref 11.5–14.5)
EST. GFR  (NO RACE VARIABLE): 40.4 ML/MIN/1.73 M^2
GLUCOSE SERPL-MCNC: 206 MG/DL (ref 70–110)
GLUCOSE UR QL STRIP: ABNORMAL
HCT VFR BLD AUTO: 40.4 % (ref 40–54)
HGB BLD-MCNC: 13.8 G/DL (ref 14–18)
HGB UR QL STRIP: NEGATIVE
IMM GRANULOCYTES # BLD AUTO: 0.06 K/UL (ref 0–0.04)
IMM GRANULOCYTES NFR BLD AUTO: 0.7 % (ref 0–0.5)
KETONES UR QL STRIP: ABNORMAL
LACTATE SERPL-SCNC: 1.7 MMOL/L (ref 0.5–2.2)
LEUKOCYTE ESTERASE UR QL STRIP: NEGATIVE
LIPASE SERPL-CCNC: 27 U/L (ref 4–60)
LYMPHOCYTES # BLD AUTO: 0.3 K/UL (ref 1–4.8)
LYMPHOCYTES NFR BLD: 3.2 % (ref 18–48)
MAGNESIUM SERPL-MCNC: 1.9 MG/DL (ref 1.6–2.6)
MCH RBC QN AUTO: 30.7 PG (ref 27–31)
MCHC RBC AUTO-ENTMCNC: 34.2 G/DL (ref 32–36)
MCV RBC AUTO: 90 FL (ref 82–98)
MONOCYTES # BLD AUTO: 0.6 K/UL (ref 0.3–1)
MONOCYTES NFR BLD: 6.9 % (ref 4–15)
NEUTROPHILS # BLD AUTO: 8.1 K/UL (ref 1.8–7.7)
NEUTROPHILS NFR BLD: 88.9 % (ref 38–73)
NITRITE UR QL STRIP: NEGATIVE
NRBC BLD-RTO: 0 /100 WBC
PH UR STRIP: 6 [PH] (ref 5–8)
PLATELET # BLD AUTO: 180 K/UL (ref 150–450)
PMV BLD AUTO: 11.2 FL (ref 9.2–12.9)
POTASSIUM SERPL-SCNC: 3.7 MMOL/L (ref 3.5–5.1)
PROT SERPL-MCNC: 7.1 G/DL (ref 6–8.4)
PROT UR QL STRIP: NEGATIVE
RBC # BLD AUTO: 4.49 M/UL (ref 4.6–6.2)
SODIUM SERPL-SCNC: 139 MMOL/L (ref 136–145)
SP GR UR STRIP: 1.01 (ref 1–1.03)
URN SPEC COLLECT METH UR: ABNORMAL
UROBILINOGEN UR STRIP-ACNC: ABNORMAL EU/DL
WBC # BLD AUTO: 9.09 K/UL (ref 3.9–12.7)

## 2023-12-12 PROCEDURE — 25500020 PHARM REV CODE 255: Performed by: EMERGENCY MEDICINE

## 2023-12-12 PROCEDURE — 85025 COMPLETE CBC W/AUTO DIFF WBC: CPT | Performed by: EMERGENCY MEDICINE

## 2023-12-12 PROCEDURE — 74177 CT ABDOMEN PELVIS WITH IV CONTRAST: ICD-10-PCS | Mod: 26,,, | Performed by: RADIOLOGY

## 2023-12-12 PROCEDURE — 99285 EMERGENCY DEPT VISIT HI MDM: CPT | Mod: 25

## 2023-12-12 PROCEDURE — 63600175 PHARM REV CODE 636 W HCPCS: Performed by: EMERGENCY MEDICINE

## 2023-12-12 PROCEDURE — 96361 HYDRATE IV INFUSION ADD-ON: CPT

## 2023-12-12 PROCEDURE — 82248 BILIRUBIN DIRECT: CPT | Performed by: EMERGENCY MEDICINE

## 2023-12-12 PROCEDURE — 80053 COMPREHEN METABOLIC PANEL: CPT | Performed by: EMERGENCY MEDICINE

## 2023-12-12 PROCEDURE — 83605 ASSAY OF LACTIC ACID: CPT | Performed by: EMERGENCY MEDICINE

## 2023-12-12 PROCEDURE — 25000003 PHARM REV CODE 250: Performed by: EMERGENCY MEDICINE

## 2023-12-12 PROCEDURE — 83735 ASSAY OF MAGNESIUM: CPT | Performed by: EMERGENCY MEDICINE

## 2023-12-12 PROCEDURE — 96375 TX/PRO/DX INJ NEW DRUG ADDON: CPT

## 2023-12-12 PROCEDURE — 96376 TX/PRO/DX INJ SAME DRUG ADON: CPT

## 2023-12-12 PROCEDURE — 36415 COLL VENOUS BLD VENIPUNCTURE: CPT | Performed by: EMERGENCY MEDICINE

## 2023-12-12 PROCEDURE — 74177 CT ABD & PELVIS W/CONTRAST: CPT | Mod: TC

## 2023-12-12 PROCEDURE — 83690 ASSAY OF LIPASE: CPT | Performed by: EMERGENCY MEDICINE

## 2023-12-12 PROCEDURE — 74177 CT ABD & PELVIS W/CONTRAST: CPT | Mod: 26,,, | Performed by: RADIOLOGY

## 2023-12-12 PROCEDURE — 81003 URINALYSIS AUTO W/O SCOPE: CPT | Performed by: EMERGENCY MEDICINE

## 2023-12-12 RX ORDER — MORPHINE SULFATE 2 MG/ML
4 INJECTION, SOLUTION INTRAMUSCULAR; INTRAVENOUS
Status: COMPLETED | OUTPATIENT
Start: 2023-12-12 | End: 2023-12-12

## 2023-12-12 RX ORDER — ONDANSETRON 2 MG/ML
4 INJECTION INTRAMUSCULAR; INTRAVENOUS
Status: COMPLETED | OUTPATIENT
Start: 2023-12-12 | End: 2023-12-12

## 2023-12-12 RX ADMIN — SODIUM CHLORIDE 1000 ML: 9 INJECTION, SOLUTION INTRAVENOUS at 08:12

## 2023-12-12 RX ADMIN — MORPHINE SULFATE 4 MG: 2 INJECTION, SOLUTION INTRAMUSCULAR; INTRAVENOUS at 11:12

## 2023-12-12 RX ADMIN — MORPHINE SULFATE 4 MG: 2 INJECTION, SOLUTION INTRAMUSCULAR; INTRAVENOUS at 08:12

## 2023-12-12 RX ADMIN — ONDANSETRON 4 MG: 2 INJECTION INTRAMUSCULAR; INTRAVENOUS at 08:12

## 2023-12-12 RX ADMIN — IOHEXOL 75 ML: 350 INJECTION, SOLUTION INTRAVENOUS at 10:12

## 2023-12-12 NOTE — Clinical Note
Diagnosis: Choledocholithiasis [125359]   Future Attending Provider: MITCHELL MINAYA [[id number]]   Admitting Provider:: VICTOR HUGO BRADY [[id number]]

## 2023-12-13 PROBLEM — N50.89 SCROTAL SWELLING: Status: ACTIVE | Noted: 2023-12-13

## 2023-12-13 PROBLEM — K83.8 DILATION OF BILIARY TRACT: Status: ACTIVE | Noted: 2023-12-13

## 2023-12-13 PROBLEM — I10 ESSENTIAL HYPERTENSION: Status: ACTIVE | Noted: 2023-12-13

## 2023-12-13 PROBLEM — I82.401 ACUTE DEEP VEIN THROMBOSIS (DVT) OF RIGHT LOWER EXTREMITY: Status: ACTIVE | Noted: 2023-12-13

## 2023-12-13 LAB
ALBUMIN SERPL BCP-MCNC: 2.8 G/DL (ref 3.5–5.2)
ALP SERPL-CCNC: 427 U/L (ref 55–135)
ALT SERPL W/O P-5'-P-CCNC: 137 U/L (ref 10–44)
ANION GAP SERPL CALC-SCNC: 13 MMOL/L (ref 8–16)
APTT PPP: 26.5 SEC (ref 21–32)
APTT PPP: 30.6 SEC (ref 21–32)
APTT PPP: 47.4 SEC (ref 21–32)
AST SERPL-CCNC: 129 U/L (ref 10–40)
BASOPHILS # BLD AUTO: 0.01 K/UL (ref 0–0.2)
BASOPHILS NFR BLD: 0.2 % (ref 0–1.9)
BILIRUB SERPL-MCNC: 4.3 MG/DL (ref 0.1–1)
BUN SERPL-MCNC: 16 MG/DL (ref 6–20)
CALCIUM SERPL-MCNC: 9.2 MG/DL (ref 8.7–10.5)
CHLORIDE SERPL-SCNC: 105 MMOL/L (ref 95–110)
CO2 SERPL-SCNC: 22 MMOL/L (ref 23–29)
CREAT SERPL-MCNC: 1.7 MG/DL (ref 0.5–1.4)
DIFFERENTIAL METHOD: ABNORMAL
EOSINOPHIL # BLD AUTO: 0 K/UL (ref 0–0.5)
EOSINOPHIL NFR BLD: 0.2 % (ref 0–8)
ERYTHROCYTE [DISTWIDTH] IN BLOOD BY AUTOMATED COUNT: 13.3 % (ref 11.5–14.5)
EST. GFR  (NO RACE VARIABLE): 46.2 ML/MIN/1.73 M^2
GLUCOSE SERPL-MCNC: 143 MG/DL (ref 70–110)
HCT VFR BLD AUTO: 33 % (ref 40–54)
HGB BLD-MCNC: 11.4 G/DL (ref 14–18)
IMM GRANULOCYTES # BLD AUTO: 0.04 K/UL (ref 0–0.04)
IMM GRANULOCYTES NFR BLD AUTO: 0.6 % (ref 0–0.5)
INR PPP: 1.1 (ref 0.8–1.2)
LYMPHOCYTES # BLD AUTO: 0.4 K/UL (ref 1–4.8)
LYMPHOCYTES NFR BLD: 6.1 % (ref 18–48)
MAGNESIUM SERPL-MCNC: 1.8 MG/DL (ref 1.6–2.6)
MCH RBC QN AUTO: 30.9 PG (ref 27–31)
MCHC RBC AUTO-ENTMCNC: 34.5 G/DL (ref 32–36)
MCV RBC AUTO: 89 FL (ref 82–98)
MONOCYTES # BLD AUTO: 0.6 K/UL (ref 0.3–1)
MONOCYTES NFR BLD: 9.7 % (ref 4–15)
NEUTROPHILS # BLD AUTO: 5.1 K/UL (ref 1.8–7.7)
NEUTROPHILS NFR BLD: 83.2 % (ref 38–73)
NRBC BLD-RTO: 0 /100 WBC
PHOSPHATE SERPL-MCNC: 3.7 MG/DL (ref 2.7–4.5)
PLATELET # BLD AUTO: 188 K/UL (ref 150–450)
PMV BLD AUTO: 10.9 FL (ref 9.2–12.9)
POCT GLUCOSE: 106 MG/DL (ref 70–110)
POCT GLUCOSE: 115 MG/DL (ref 70–110)
POCT GLUCOSE: 135 MG/DL (ref 70–110)
POTASSIUM SERPL-SCNC: 3.7 MMOL/L (ref 3.5–5.1)
PROT SERPL-MCNC: 6 G/DL (ref 6–8.4)
PROTHROMBIN TIME: 11.8 SEC (ref 9–12.5)
RBC # BLD AUTO: 3.69 M/UL (ref 4.6–6.2)
SODIUM SERPL-SCNC: 140 MMOL/L (ref 136–145)
WBC # BLD AUTO: 6.18 K/UL (ref 3.9–12.7)

## 2023-12-13 PROCEDURE — 85610 PROTHROMBIN TIME: CPT | Performed by: HOSPITALIST

## 2023-12-13 PROCEDURE — 25000003 PHARM REV CODE 250: Performed by: HOSPITALIST

## 2023-12-13 PROCEDURE — 82962 GLUCOSE BLOOD TEST: CPT

## 2023-12-13 PROCEDURE — 36415 COLL VENOUS BLD VENIPUNCTURE: CPT | Performed by: HOSPITALIST

## 2023-12-13 PROCEDURE — 96376 TX/PRO/DX INJ SAME DRUG ADON: CPT

## 2023-12-13 PROCEDURE — 96365 THER/PROPH/DIAG IV INF INIT: CPT

## 2023-12-13 PROCEDURE — 96374 THER/PROPH/DIAG INJ IV PUSH: CPT | Mod: 59

## 2023-12-13 PROCEDURE — 83735 ASSAY OF MAGNESIUM: CPT | Performed by: HOSPITALIST

## 2023-12-13 PROCEDURE — 85730 THROMBOPLASTIN TIME PARTIAL: CPT | Performed by: HOSPITALIST

## 2023-12-13 PROCEDURE — 96367 TX/PROPH/DG ADDL SEQ IV INF: CPT

## 2023-12-13 PROCEDURE — 94761 N-INVAS EAR/PLS OXIMETRY MLT: CPT

## 2023-12-13 PROCEDURE — 84100 ASSAY OF PHOSPHORUS: CPT | Performed by: HOSPITALIST

## 2023-12-13 PROCEDURE — 99223 PR INITIAL HOSPITAL CARE,LEVL III: ICD-10-PCS | Mod: 95,,, | Performed by: HOSPITALIST

## 2023-12-13 PROCEDURE — 96366 THER/PROPH/DIAG IV INF ADDON: CPT

## 2023-12-13 PROCEDURE — 25000003 PHARM REV CODE 250: Performed by: STUDENT IN AN ORGANIZED HEALTH CARE EDUCATION/TRAINING PROGRAM

## 2023-12-13 PROCEDURE — 85025 COMPLETE CBC W/AUTO DIFF WBC: CPT | Performed by: HOSPITALIST

## 2023-12-13 PROCEDURE — 99223 1ST HOSP IP/OBS HIGH 75: CPT | Mod: 95,,, | Performed by: HOSPITALIST

## 2023-12-13 PROCEDURE — 96361 HYDRATE IV INFUSION ADD-ON: CPT

## 2023-12-13 PROCEDURE — 12000002 HC ACUTE/MED SURGE SEMI-PRIVATE ROOM

## 2023-12-13 PROCEDURE — 80053 COMPREHEN METABOLIC PANEL: CPT | Performed by: HOSPITALIST

## 2023-12-13 PROCEDURE — 63600175 PHARM REV CODE 636 W HCPCS: Performed by: STUDENT IN AN ORGANIZED HEALTH CARE EDUCATION/TRAINING PROGRAM

## 2023-12-13 PROCEDURE — 63600175 PHARM REV CODE 636 W HCPCS: Performed by: HOSPITALIST

## 2023-12-13 RX ORDER — HEPARIN SODIUM,PORCINE/D5W 25000/250
0-40 INTRAVENOUS SOLUTION INTRAVENOUS CONTINUOUS
Status: DISCONTINUED | OUTPATIENT
Start: 2023-12-13 | End: 2023-12-15 | Stop reason: HOSPADM

## 2023-12-13 RX ORDER — ACETAMINOPHEN 325 MG/1
650 TABLET ORAL EVERY 4 HOURS PRN
Status: DISCONTINUED | OUTPATIENT
Start: 2023-12-13 | End: 2023-12-15 | Stop reason: HOSPADM

## 2023-12-13 RX ORDER — NALOXONE HCL 0.4 MG/ML
0.02 VIAL (ML) INJECTION
Status: DISCONTINUED | OUTPATIENT
Start: 2023-12-13 | End: 2023-12-15 | Stop reason: HOSPADM

## 2023-12-13 RX ORDER — CEFTRIAXONE 1 G/1
INJECTION, POWDER, FOR SOLUTION INTRAMUSCULAR; INTRAVENOUS
Status: DISPENSED
Start: 2023-12-13 | End: 2023-12-13

## 2023-12-13 RX ORDER — ONDANSETRON 2 MG/ML
4 INJECTION INTRAMUSCULAR; INTRAVENOUS EVERY 6 HOURS PRN
Status: DISCONTINUED | OUTPATIENT
Start: 2023-12-13 | End: 2023-12-15 | Stop reason: HOSPADM

## 2023-12-13 RX ORDER — IBUPROFEN 200 MG
16 TABLET ORAL
Status: DISCONTINUED | OUTPATIENT
Start: 2023-12-13 | End: 2023-12-15 | Stop reason: HOSPADM

## 2023-12-13 RX ORDER — GLUCAGON 1 MG
1 KIT INJECTION
Status: DISCONTINUED | OUTPATIENT
Start: 2023-12-13 | End: 2023-12-15 | Stop reason: HOSPADM

## 2023-12-13 RX ORDER — SODIUM CHLORIDE 0.9 % (FLUSH) 0.9 %
10 SYRINGE (ML) INJECTION
Status: DISCONTINUED | OUTPATIENT
Start: 2023-12-13 | End: 2023-12-15 | Stop reason: HOSPADM

## 2023-12-13 RX ORDER — AMLODIPINE BESYLATE 5 MG/1
10 TABLET ORAL DAILY
Status: DISCONTINUED | OUTPATIENT
Start: 2023-12-13 | End: 2023-12-15 | Stop reason: HOSPADM

## 2023-12-13 RX ORDER — IBUPROFEN 200 MG
24 TABLET ORAL
Status: DISCONTINUED | OUTPATIENT
Start: 2023-12-13 | End: 2023-12-15 | Stop reason: HOSPADM

## 2023-12-13 RX ORDER — MORPHINE SULFATE 2 MG/ML
4 INJECTION, SOLUTION INTRAMUSCULAR; INTRAVENOUS EVERY 4 HOURS PRN
Status: DISCONTINUED | OUTPATIENT
Start: 2023-12-13 | End: 2023-12-15 | Stop reason: HOSPADM

## 2023-12-13 RX ORDER — METRONIDAZOLE 500 MG/100ML
500 INJECTION, SOLUTION INTRAVENOUS
Status: DISCONTINUED | OUTPATIENT
Start: 2023-12-13 | End: 2023-12-15 | Stop reason: HOSPADM

## 2023-12-13 RX ORDER — INSULIN ASPART 100 [IU]/ML
0-10 INJECTION, SOLUTION INTRAVENOUS; SUBCUTANEOUS EVERY 6 HOURS PRN
Status: DISCONTINUED | OUTPATIENT
Start: 2023-12-13 | End: 2023-12-15 | Stop reason: HOSPADM

## 2023-12-13 RX ORDER — IPRATROPIUM BROMIDE AND ALBUTEROL SULFATE 2.5; .5 MG/3ML; MG/3ML
3 SOLUTION RESPIRATORY (INHALATION) EVERY 4 HOURS PRN
Status: DISCONTINUED | OUTPATIENT
Start: 2023-12-13 | End: 2023-12-13

## 2023-12-13 RX ORDER — SODIUM CHLORIDE 9 MG/ML
INJECTION, SOLUTION INTRAVENOUS CONTINUOUS
Status: DISCONTINUED | OUTPATIENT
Start: 2023-12-13 | End: 2023-12-15 | Stop reason: HOSPADM

## 2023-12-13 RX ADMIN — HEPARIN SODIUM 18.04 UNITS/KG/HR: 10000 INJECTION, SOLUTION INTRAVENOUS at 09:12

## 2023-12-13 RX ADMIN — ONDANSETRON 4 MG: 2 INJECTION INTRAMUSCULAR; INTRAVENOUS at 08:12

## 2023-12-13 RX ADMIN — METRONIDAZOLE 500 MG: 5 INJECTION, SOLUTION INTRAVENOUS at 08:12

## 2023-12-13 RX ADMIN — HEPARIN SODIUM 18 UNITS/KG/HR: 10000 INJECTION, SOLUTION INTRAVENOUS at 11:12

## 2023-12-13 RX ADMIN — AMLODIPINE BESYLATE 10 MG: 2.5 TABLET ORAL at 08:12

## 2023-12-13 RX ADMIN — ONDANSETRON 4 MG: 2 INJECTION INTRAMUSCULAR; INTRAVENOUS at 10:12

## 2023-12-13 RX ADMIN — ONDANSETRON 4 MG: 2 INJECTION INTRAMUSCULAR; INTRAVENOUS at 02:12

## 2023-12-13 RX ADMIN — METRONIDAZOLE 500 MG: 5 INJECTION, SOLUTION INTRAVENOUS at 03:12

## 2023-12-13 RX ADMIN — MORPHINE SULFATE 4 MG: 2 INJECTION, SOLUTION INTRAMUSCULAR; INTRAVENOUS at 10:12

## 2023-12-13 RX ADMIN — MORPHINE SULFATE 4 MG: 2 INJECTION, SOLUTION INTRAMUSCULAR; INTRAVENOUS at 08:12

## 2023-12-13 RX ADMIN — SODIUM CHLORIDE: 9 INJECTION, SOLUTION INTRAVENOUS at 02:12

## 2023-12-13 RX ADMIN — SODIUM CHLORIDE: 9 INJECTION, SOLUTION INTRAVENOUS at 11:12

## 2023-12-13 RX ADMIN — CEFTRIAXONE 1 G: 1 INJECTION, POWDER, FOR SOLUTION INTRAMUSCULAR; INTRAVENOUS at 08:12

## 2023-12-13 RX ADMIN — MORPHINE SULFATE 4 MG: 2 INJECTION, SOLUTION INTRAMUSCULAR; INTRAVENOUS at 02:12

## 2023-12-13 NOTE — ASSESSMENT & PLAN NOTE
Case discussed with ER physician  CT showed CBD and gallbladder distention to 5 mm  Elevated LFTs   Pain control with morphine, IVF, and antiemetics  Awaiting a bed at [facility] in [place]

## 2023-12-13 NOTE — ED NOTES
Timo with [facility] called to get update on patient's condition. Timo stated they are working on getting the patient a bed at [facility] and will call us back when a bed is assigned.

## 2023-12-13 NOTE — ED NOTES
"Called [facility] to get update on transfer. Spoke with Maritza, states "We are waiting on a med-tele bed".   "

## 2023-12-13 NOTE — ASSESSMENT & PLAN NOTE
Continue amlodipine at 5 mg daily   Follow up with PCP as scheduled  May need an alternative antihypertensive agent due to LE swelling

## 2023-12-13 NOTE — ED NOTES
Patient updated on delay bed assignment at [facility]. Patient verbalized understanding and appreciation.

## 2023-12-13 NOTE — HPI
The patient is a 57 y/o male with PMH of HTN, colon ca s/p colectomy and DVT who presented with RUQ abdominal pain which started on the am of 12/12 and woke him from his sleep. Patient is accompanied by his friend who also provides some of the history. He had associated nausea and vomiting. [place] to [place]. Work up in the ER showed on CT, CBD and gallbladder distention to 5 mm but no stone visible. Pain controlled with morphine. No fevers reported. Patient was recently diagnosed with RLE DVT and is on apixaban. Patient has been accepted for transfer to [facility] in [place] but is awaiting a bed.

## 2023-12-13 NOTE — H&P
[facility]  History & Physical    Patient Name: Chase De La O  MRN: [id number]  Admission Date: 12/12/2023  Attending Physician: Everett Villegas MD   Primary Care Provider: Janny Primary Doctor         Patient information was obtained from patient, caregiver / friend, and ER records.       Subjective:     Principal Problem:Dilation of biliary tract    Chief Complaint:   Chief Complaint   Patient presents with    Abdominal Pain     Reports RUQ pain that woke him from his sleep this morning. Associated n/v/d. No exacerbating or alleviating factors.         HPI: The patient is a 57 y/o male with PMH of HTN, colon ca s/p colectomy and DVT who presented with RUQ abdominal pain which started on the am of 12/12 and woke him from his sleep. Patient is accompanied by his friend who also provides some of the history. He had associated nausea and vomiting. He was treated for his colon cancer from out of [facility] and recently moved to [place]. Work up in the ER showed on CT, CBD and gallbladder distention to 5 mm but no stone visible. Pain controlled with morphine. No fevers reported. Patient was recently diagnosed with RLE DVT and is on apixaban. Patient has been accepted for transfer to [facility] in [place] but is awaiting a bed.     Past Medical History:   Diagnosis Date    Acute kidney injury     DVT (deep venous thrombosis)     Hypertension     Rectal cancer        Past Surgical History:   Procedure Laterality Date    BACK SURGERY      COLOSTOMY      NECK SURGERY      SPINE SURGERY         Review of patient's allergies indicates:   Allergen Reactions    Codeine Nausea And Vomiting       No current facility-administered medications on file prior to encounter.     Current Outpatient Medications on File Prior to Encounter   Medication Sig    amLODIPine (NORVASC) 10 MG tablet Take 10 mg by mouth once daily.    apixaban (ELIQUIS) 5 mg Tab Take 2 tablets (10 mg total) by mouth 2  (two) times daily for 7 days, THEN 1 tablet (5 mg total) 2 (two) times daily for 21 days. (Patient taking differently: Take one tablet by mouth twice a day.)    cephALEXin (KEFLEX) 500 MG capsule Take 500 mg by mouth every 6 (six) hours.    ondansetron (ZOFRAN-ODT) 4 MG TbDL Take 1 tablet (4 mg total) by mouth every 6 (six) hours as needed.    oxyCODONE-acetaminophen (PERCOCET) 7.5-325 mg per tablet Take 1 tablet by mouth every 6 (six) hours as needed for Pain.    tamsulosin (FLOMAX) 0.4 mg Cap Take by mouth once daily.    vitamin D (VITAMIN D3) 1000 units Tab Take 50,000 Units by mouth once a week. Weekly on Mondays     Family History    None       Tobacco Use    Smoking status: Smoker, Current Status Unknown    Smokeless tobacco: Not on file   Substance and Sexual Activity    Alcohol use: Yes    Drug use: Never    Sexual activity: Not on file     Review of Systems   Constitutional:  Positive for activity change and appetite change.   Cardiovascular:  Positive for leg swelling (improving).   Gastrointestinal:  Positive for abdominal pain, nausea and vomiting.   Genitourinary:  Positive for scrotal swelling (hydrocele).     Objective:     Vital Signs (Most Recent):  Temp: 97.5 °F (36.4 °C) (12/12/23 1916)  Pulse: 97 (12/12/23 2331)  Resp: 18 (12/12/23 2331)  BP: (!) 129/91 (12/13/23 0231)  SpO2: 99 % (12/13/23 0231) Vital Signs (24h Range):  Temp:  [97.5 °F (36.4 °C)] 97.5 °F (36.4 °C)  Pulse:  [] 97  Resp:  [16-20] 18  SpO2:  [95 %-100 %] 99 %  BP: (129-173)/() 129/91     Weight: 90.7 kg (200 lb)  Body mass index is 29.53 kg/m².     Physical Exam  Pulmonary:      Effort: Pulmonary effort is normal. No respiratory distress.   Musculoskeletal:      Right lower leg: Edema present.      Left lower leg: Edema present.   Neurological:      Mental Status: He is alert and oriented to person, place, and time.                Significant Labs:  CBC, CMP reviewed     Significant Imaging:  CT reviewed    Assessment/Plan:     * Dilation of biliary tract  Case discussed with ER physician  CT showed CBD and gallbladder distention to 5 mm  Elevated LFTs   Pain control with morphine, IVF, and antiemetics  Awaiting a bed at [facility] in [place]        Scrotal swelling  Hydrocele noted on US during last ER visit  Follow up with urology  Scrotal elevation       Essential hypertension  Continue amlodipine at 5 mg daily   Follow up with PCP as scheduled  May need an alternative antihypertensive agent due to LE swelling       Acute deep vein thrombosis (DVT) of right lower extremity  Patient on apixaban   Case discussed with AES on call at [facility] and they advised placing the patient on a heparin drip for anticoagulation management angelina-procedure          VTE Risk Mitigation (From admission, onward)           Ordered     heparin 25,000 units in dextrose 5% (100 units/ml) IV bolus from bag - ADDITIONAL PRN BOLUS - 60 units/kg  As needed (PRN)        Question:  Heparin Infusion Adjustment (DO NOT MODIFY ANSWER)  Answer:  \[facility]\epic\Images\Pharmacy\HeparinInfusions\heparin HIGH INTENSITY nomogram for [facility] [id number]    12/13/23 0302     heparin 25,000 units in dextrose 5% (100 units/ml) IV bolus from bag - ADDITIONAL PRN BOLUS - 30 units/kg  As needed (PRN)        Question:  Heparin Infusion Adjustment (DO NOT MODIFY ANSWER)  Answer:  \[facility]\epic\Images\Pharmacy\HeparinInfusions\heparin HIGH INTENSITY nomogram for [facility] [id number].pdf    12/13/23 0302     heparin 25,000 units in dextrose 5% (100 units/ml) IV bolus from bag INITIAL BOLUS  Once        Question:  Heparin Infusion Adjustment (DO NOT MODIFY ANSWER)  Answer:  \[facility]\epic\Images\Pharmacy\HeparinInfusions\heparin HIGH INTENSITY nomogram for [facility] [id number].pdf    12/13/23 0302     heparin 25,000 units in dextrose 5% 250 mL (100 units/mL) infusion HIGH INTENSITY nomogram - [facility]  Continuous        Question:  Begin at (units/kg/hr)   Answer:  18    12/13/23 0302     Reason for No Pharmacological VTE Prophylaxis  Once        Question:  Reasons:  Answer:  Already adequately anticoagulated on oral Anticoagulants    12/13/23 0152                         The attending portion of this evaluation, treatment, and documentation was performed per Torie Ivy MD via Telemedicine AudioVisual using the secure [employer] software platform with 2 way audio/video. The provider was located off-site and the patient is located in the hospital. The aforementioned video software was utilized to document the relevant history and physical exam    On 12/13/2023, patient should be placed in hospital observation services under my care.      Torie Ivy MD  Department of Hospital Medicine   [facility] - Emergency Dept

## 2023-12-13 NOTE — ED NOTES
Ice chips provided to patient. Patient resting comfortably. Visitor at bedside. No acute distress noted.

## 2023-12-13 NOTE — SUBJECTIVE & OBJECTIVE
Past Medical History:   Diagnosis Date    Acute kidney injury     DVT (deep venous thrombosis)     Hypertension     Rectal cancer        Past Surgical History:   Procedure Laterality Date    BACK SURGERY      COLOSTOMY      NECK SURGERY      SPINE SURGERY         Review of patient's allergies indicates:   Allergen Reactions    Codeine Nausea And Vomiting       No current facility-administered medications on file prior to encounter.     Current Outpatient Medications on File Prior to Encounter   Medication Sig    amLODIPine (NORVASC) 10 MG tablet Take 10 mg by mouth once daily.    apixaban (ELIQUIS) 5 mg Tab Take 2 tablets (10 mg total) by mouth 2 (two) times daily for 7 days, THEN 1 tablet (5 mg total) 2 (two) times daily for 21 days. (Patient taking differently: Take one tablet by mouth twice a day.)    cephALEXin (KEFLEX) 500 MG capsule Take 500 mg by mouth every 6 (six) hours.    ondansetron (ZOFRAN-ODT) 4 MG TbDL Take 1 tablet (4 mg total) by mouth every 6 (six) hours as needed.    oxyCODONE-acetaminophen (PERCOCET) 7.5-325 mg per tablet Take 1 tablet by mouth every 6 (six) hours as needed for Pain.    tamsulosin (FLOMAX) 0.4 mg Cap Take by mouth once daily.    vitamin D (VITAMIN D3) 1000 units Tab Take 50,000 Units by mouth once a week. Weekly on Mondays     Family History    None       Tobacco Use    Smoking status: Smoker, Current Status Unknown    Smokeless tobacco: Not on file   Substance and Sexual Activity    Alcohol use: Yes    Drug use: Never    Sexual activity: Not on file     Review of Systems   Constitutional:  Positive for activity change and appetite change.   Cardiovascular:  Positive for leg swelling (improving).   Gastrointestinal:  Positive for abdominal pain, nausea and vomiting.   Genitourinary:  Positive for scrotal swelling (hydrocele).     Objective:     Vital Signs (Most Recent):  Temp: 97.5 °F (36.4 °C) (12/12/23 1916)  Pulse: 97 (12/12/23 2331)  Resp: 18 (12/12/23 2331)  BP: (!) 129/91  (12/13/23 0231)  SpO2: 99 % (12/13/23 0231) Vital Signs (24h Range):  Temp:  [97.5 °F (36.4 °C)] 97.5 °F (36.4 °C)  Pulse:  [] 97  Resp:  [16-20] 18  SpO2:  [95 %-100 %] 99 %  BP: (129-173)/() 129/91     Weight: 90.7 kg (200 lb)  Body mass index is 29.53 kg/m².     Physical Exam  Pulmonary:      Effort: Pulmonary effort is normal. No respiratory distress.   Musculoskeletal:      Right lower leg: Edema present.      Left lower leg: Edema present.   Neurological:      Mental Status: He is alert and oriented to person, place, and time.                Significant Labs:  CBC, CMP reviewed     Significant Imaging:  CT reviewed

## 2023-12-13 NOTE — ED NOTES
"Called [facility] to get update on bed assignment. Spoke with Yaquelin, states "We are still just waiting on a bed".   "

## 2023-12-13 NOTE — PLAN OF CARE
[facility] - Emergency Dept  Initial Discharge Assessment       Primary Care Provider: Janny, Primary Doctor    Admission Diagnosis: Choledocholithiasis [K80.50]    Admission Date: 12/12/2023  Expected Discharge Date:     Transition of Care Barriers: None    Payor: [employer] / Plan: [facility] [employer] / Product Type: Government /     Extended Emergency Contact Information  Primary Emergency Contact: Karol Baum  Mobile Phone: [phone]  Relation: Sister  Preferred language: English   needed? No  Secondary Emergency Contact: COLLIN HOFFMAN  Mobile Phone: [phone]  Relation: Brother   needed? No    Discharge Plan A: Hospital Transfer  Discharge Plan B: Home    Pt awaiting transfer to [facility]. DC assessment completed with patient and friends at bedside. Verified information on facesheet as correct. Denies POA. Reports RICHI is his brother Ramón. Lives at listed address alone. Has help from friends if/when needed. Does not currently have PCP but does have apt to get established with Dr. Alexandro Srinivasan in [facility] on 12/21. [facility] [place] [facility] Denies hh/hd/dme/outpt services. On eliquis prior to admit. Independent with activities. Able to drive himself to apts. Reports that his brother,Ramón, will provide transportation home upon DC. Verified insurance on file. Reports recent admission at a hospital in [place], [place]. Reports taking home medications as prescribed and can currently afford them. Dc plan for now is home- may change pending clinical course at [facility]    Initial Assessment (most recent)       Adult Discharge Assessment - 12/13/23 1218          Discharge Assessment    Assessment Type Discharge Planning Assessment     Confirmed/corrected address, phone number and insurance Yes     Confirmed Demographics Correct on Facesheet     Source of Information patient;family     Communicated JOHNNY with patient/caregiver Yes     Reason For  Admission choledocholithiasis     People in Home alone     Facility Arrived From: ed     Do you expect to return to your current living situation? Yes     Do you have help at home or someone to help you manage your care at home? Yes     Prior to hospitilization cognitive status: Alert/Oriented     Current cognitive status: Alert/Oriented     Walking or Climbing Stairs Difficulty no     Dressing/Bathing Difficulty no     Equipment Currently Used at Home none     Readmission within 30 days? No     Patient currently being followed by outpatient case management? No     Do you currently have service(s) that help you manage your care at home? No     Do you take prescription medications? Yes     Do you have prescription coverage? Yes     [place]     Do you have any problems affording any of your prescribed medications? No     Is the patient taking medications as prescribed? yes     Who is going to help you get home at discharge? brother     How do you get to doctors appointments? car, drives self     Are you on dialysis? No     Do you take coumadin? No     Discharge Plan A Hospital Transfer     Discharge Plan B Home     DME Needed Upon Discharge  none     Discharge Plan discussed with: Patient;Friend     Transition of Care Barriers None

## 2023-12-13 NOTE — CARE UPDATE
Seen via virtual platform.  Pt states he is feeling better, pain meds effective.  US abd pending.  He is awaiting transfer to [facility].  Denies needs at this time.  NPO status discussed.

## 2023-12-13 NOTE — ED PROVIDER NOTES
Encounter Date: 12/12/2023       History     Chief Complaint   Patient presents with    Abdominal Pain     Reports RUQ pain that woke him from his sleep this morning. Associated n/v/d. No exacerbating or alleviating factors.      58-year-old male with epigastric and right upper quadrant pain associated with nausea vomiting and decreased ostomy output since yesterday.  Patient has a history of previous colon cancer that was partially treated but he moved to the area and has never really followed up regarding that.  He does still have his gallbladder.  He does have an ostomy.    The history is provided by the patient. No  was used.     Review of patient's allergies indicates:   Allergen Reactions    Codeine Nausea And Vomiting     Past Medical History:   Diagnosis Date    Acute kidney injury     DVT (deep venous thrombosis)     Hypertension     Rectal cancer      Past Surgical History:   Procedure Laterality Date    BACK SURGERY      COLOSTOMY      NECK SURGERY      SPINE SURGERY       No family history on file.  Social History     Tobacco Use    Smoking status: Smoker, Current Status Unknown   Substance Use Topics    Alcohol use: Yes    Drug use: Never     Review of Systems   Constitutional:  Negative for fever.   HENT:  Negative for sore throat.    Respiratory:  Negative for shortness of breath.    Cardiovascular:  Negative for chest pain.   Gastrointestinal:  Positive for abdominal pain, nausea and vomiting.   Genitourinary:  Negative for dysuria.   Musculoskeletal:  Negative for back pain.   Skin:  Negative for rash.   Neurological:  Negative for weakness.   Hematological:  Does not bruise/bleed easily.   All other systems reviewed and are negative.      Physical Exam     Initial Vitals [12/12/23 1916]   BP Pulse Resp Temp SpO2   (!) 140/88 104 20 97.5 °F (36.4 °C) 96 %      MAP       --         Physical Exam    Nursing note and vitals reviewed.  Constitutional: He appears distressed.   HENT:    Head: Normocephalic and atraumatic.   Eyes: EOM are normal. Pupils are equal, round, and reactive to light.   Neck:   Normal range of motion.  Cardiovascular:  Regular rhythm.           Tachycardic   Pulmonary/Chest: Breath sounds normal. No respiratory distress.   Abdominal: Abdomen is soft. There is abdominal tenderness. There is no rebound and no guarding.   Musculoskeletal:         General: Normal range of motion.      Cervical back: Normal range of motion.     Neurological: He is alert and oriented to person, place, and time. GCS score is 15. GCS eye subscore is 4. GCS verbal subscore is 5. GCS motor subscore is 6.   Skin: Skin is warm. Capillary refill takes less than 2 seconds.         ED Course   Procedures  Labs Reviewed   CBC W/ AUTO DIFFERENTIAL - Abnormal; Notable for the following components:       Result Value    RBC 4.49 (*)     Hemoglobin 13.8 (*)     Immature Granulocytes 0.7 (*)     Gran # (ANC) 8.1 (*)     Immature Grans (Abs) 0.06 (*)     Lymph # 0.3 (*)     Gran % 88.9 (*)     Lymph % 3.2 (*)     All other components within normal limits   COMPREHENSIVE METABOLIC PANEL - Abnormal; Notable for the following components:    CO2 21 (*)     Glucose 206 (*)     Creatinine 1.9 (*)     Albumin 3.3 (*)     Total Bilirubin 4.4 (*)     Alkaline Phosphatase 479 (*)      (*)      (*)     eGFR 40.4 (*)     Anion Gap 17 (*)     All other components within normal limits   URINALYSIS, REFLEX TO URINE CULTURE - Abnormal; Notable for the following components:    Glucose, UA Trace (*)     Ketones, UA 2+ (*)     Bilirubin (UA) 1+ (*)     Urobilinogen, UA 2.0-3.0 (*)     All other components within normal limits    Narrative:     Preferred Collection Type->Urine, Clean Catch  Specimen Source->Urine   BILIRUBIN, DIRECT - Abnormal; Notable for the following components:    Bilirubin, Direct 3.5 (*)     All other components within normal limits   POCT GLUCOSE - Abnormal; Notable for the following  components:    POCT Glucose 135 (*)     All other components within normal limits   LACTIC ACID, PLASMA   LIPASE   MAGNESIUM   BILIRUBIN, DIRECT   CBC W/ AUTO DIFFERENTIAL   MAGNESIUM   PHOSPHORUS   COMPREHENSIVE METABOLIC PANEL   APTT   PROTIME-INR   POCT GLUCOSE MONITORING CONTINUOUS          Imaging Results              CT Abdomen Pelvis With IV Contrast NO Oral Contrast (In process)                      Medications   amLODIPine tablet 10 mg (has no administration in time range)   sodium chloride 0.9% flush 10 mL (has no administration in time range)   albuterol-ipratropium 2.5 mg-0.5 mg/3 mL nebulizer solution 3 mL (has no administration in time range)   naloxone 0.4 mg/mL injection 0.02 mg (has no administration in time range)   glucose chewable tablet 16 g (has no administration in time range)   glucose chewable tablet 24 g (has no administration in time range)   glucagon (human recombinant) injection 1 mg (has no administration in time range)   glucagon (human recombinant) injection 1 mg (has no administration in time range)   acetaminophen tablet 650 mg (has no administration in time range)   insulin aspart U-100 pen 0-10 Units (has no administration in time range)   dextrose 10% bolus 125 mL 125 mL (has no administration in time range)   dextrose 10% bolus 250 mL 250 mL (has no administration in time range)   0.9%  NaCl infusion ( Intravenous New Bag 12/13/23 0251)   morphine injection 4 mg (has no administration in time range)   ondansetron injection 4 mg (has no administration in time range)   heparin 25,000 units in dextrose 5% (100 units/ml) IV bolus from bag INITIAL BOLUS (has no administration in time range)   heparin 25,000 units in dextrose 5% 250 mL (100 units/mL) infusion HIGH INTENSITY nomogram - [facility] (has no administration in time range)   heparin 25,000 units in dextrose 5% (100 units/ml) IV bolus from bag - ADDITIONAL PRN BOLUS - 60 units/kg (has no administration in time range)   heparin  25,000 units in dextrose 5% (100 units/ml) IV bolus from bag - ADDITIONAL PRN BOLUS - 30 units/kg (has no administration in time range)   ondansetron injection 4 mg (4 mg Intravenous Given 12/12/23 2040)   morphine injection 4 mg (4 mg Intravenous Given 12/12/23 2040)   sodium chloride 0.9% bolus 1,000 mL 1,000 mL (0 mLs Intravenous Stopped 12/12/23 2141)   iohexoL (OMNIPAQUE 350) injection 75 mL (75 mLs Intravenous Given 12/12/23 2246)   morphine injection 4 mg (4 mg Intravenous Given 12/12/23 2315)     Medical Decision Making  Differential diagnosis for this patient includes pancreatitis, cholecystitis, choledocholithiasis, bowel obstruction    Labs are indicative of biliary pathology.  CT scan shows evidence of biliary obstruction.  Patient will be transferred to [facility] in the morning     Amount and/or Complexity of Data Reviewed  Labs: ordered. Decision-making details documented in ED Course.  Radiology: ordered. Decision-making details documented in ED Course.    Risk  Prescription drug management.                                      Clinical Impression:  Final diagnoses:  [K80.50] Choledocholithiasis (Primary)          ED Disposition Condition    Observation                 Liz Benoit MD  12/13/23 2219

## 2023-12-13 NOTE — ASSESSMENT & PLAN NOTE
Patient on apixaban   Case discussed with AES on call at [facility] and they advised placing the patient on a heparin drip for anticoagulation management angelina-procedure

## 2023-12-13 NOTE — PROVIDER TRANSFER
Outside Transfer Acceptance Note / Regional Referral Center    Referring facility: [facility]   Referring provider: KAYLEE BOB  Accepting facility: [facility]  Accepting provider: Torie Ivy  Admitting provider: Torie Ivy   Reason for transfer:  AES evaluation   Transfer diagnosis: Biliary dilatation   Transfer specialty requested: General Surgery / AES   Transfer specialty notified: Yes  Transfer level: NUMBER 1-5: 2  Bed type requested: Telemetry   Isolation status: No active isolations   Admission class or status: IP- Inpatient      Narrative     The patient is a 59 y/o male with PMH of rectal cancer s/p ostomy, COPD, HTN, and VTE on apixaban. [place]. He presented with RUQ abdominal pain associated with nausea and vomiting. CT showed Biliary distention with CBD dilatation to 5 mm and gallbladder distention. No calcified stones noted. Bili 4.4, , , . Pain controlled with morphine. Case discussed with AES. Recommended initiating heparin drip; will hold apixaban. DVT was recent as of a week ago. Stable for transfer.       Objective     Vitals: Temp: 97.5 °F (36.4 °C) (12/12/23 1916)  Pulse: 98 (12/12/23 2306)  Resp: 16 (12/12/23 2315)  BP: (!) 173/100 (12/12/23 2306)  SpO2: 99 % (12/12/23 2306)  Recent Labs: All pertinent labs within the past 24 hours have been reviewed.  Recent imaging: CT as above    Airway:     Vent settings:         IV access:        Peripheral IV - Single Lumen 12/12/23 2039 22 G Anterior;Distal;Right Upper Arm (Active)   Site Assessment Clean;Dry;Intact 12/12/23 2040     Infusions:   Allergies:   Review of patient's allergies indicates:   Allergen Reactions    Codeine Nausea And Vomiting      NPO: No    Anticoagulation:   Anticoagulants       None             Instructions      Branden Scherer-  Admit to Hospital Medicine  Upon patient arrival to floor, please send SecureChat to [facility] or call extension [phone]  (if no answer, do NOT leave a callback number after the beep, rather please send a SecureChat to [facility]), for Hospital Medicine admit team assignment and for additional admit orders for the patient.  Do not page the attending physician associated with the patient on arrival (this physician may not be on duty at the time of arrival).  Rather, always send a SecureChat to [facility] or call [phone] to reach the triage physician for orders and team assignment.

## 2023-12-13 NOTE — ED NOTES
Patient placed on the monitor. Patient provided with a urinal and instructed on the need for a urine sample.

## 2023-12-14 LAB
ALBUMIN SERPL BCP-MCNC: 3 G/DL (ref 3.5–5.2)
ALP SERPL-CCNC: 471 U/L (ref 55–135)
ALT SERPL W/O P-5'-P-CCNC: 135 U/L (ref 10–44)
ANION GAP SERPL CALC-SCNC: 13 MMOL/L (ref 8–16)
APTT PPP: 45.4 SEC (ref 21–32)
AST SERPL-CCNC: 77 U/L (ref 10–40)
BASOPHILS # BLD AUTO: 0.02 K/UL (ref 0–0.2)
BASOPHILS NFR BLD: 0.4 % (ref 0–1.9)
BILIRUB SERPL-MCNC: 1.4 MG/DL (ref 0.1–1)
BUN SERPL-MCNC: 15 MG/DL (ref 6–20)
CALCIUM SERPL-MCNC: 9.4 MG/DL (ref 8.7–10.5)
CHLORIDE SERPL-SCNC: 108 MMOL/L (ref 95–110)
CO2 SERPL-SCNC: 22 MMOL/L (ref 23–29)
CREAT SERPL-MCNC: 1.8 MG/DL (ref 0.5–1.4)
DIFFERENTIAL METHOD: ABNORMAL
EOSINOPHIL # BLD AUTO: 0.1 K/UL (ref 0–0.5)
EOSINOPHIL NFR BLD: 1.2 % (ref 0–8)
ERYTHROCYTE [DISTWIDTH] IN BLOOD BY AUTOMATED COUNT: 13.9 % (ref 11.5–14.5)
EST. GFR  (NO RACE VARIABLE): 43.1 ML/MIN/1.73 M^2
GLUCOSE SERPL-MCNC: 100 MG/DL (ref 70–110)
HCT VFR BLD AUTO: 38.2 % (ref 40–54)
HGB BLD-MCNC: 12.6 G/DL (ref 14–18)
IMM GRANULOCYTES # BLD AUTO: 0.03 K/UL (ref 0–0.04)
IMM GRANULOCYTES NFR BLD AUTO: 0.6 % (ref 0–0.5)
LYMPHOCYTES # BLD AUTO: 0.3 K/UL (ref 1–4.8)
LYMPHOCYTES NFR BLD: 6.3 % (ref 18–48)
MCH RBC QN AUTO: 30.4 PG (ref 27–31)
MCHC RBC AUTO-ENTMCNC: 33 G/DL (ref 32–36)
MCV RBC AUTO: 92 FL (ref 82–98)
MONOCYTES # BLD AUTO: 0.5 K/UL (ref 0.3–1)
MONOCYTES NFR BLD: 9 % (ref 4–15)
NEUTROPHILS # BLD AUTO: 4.3 K/UL (ref 1.8–7.7)
NEUTROPHILS NFR BLD: 82.5 % (ref 38–73)
NRBC BLD-RTO: 0 /100 WBC
PLATELET # BLD AUTO: 173 K/UL (ref 150–450)
PMV BLD AUTO: 11 FL (ref 9.2–12.9)
POCT GLUCOSE: 122 MG/DL (ref 70–110)
POCT GLUCOSE: 88 MG/DL (ref 70–110)
POTASSIUM SERPL-SCNC: 3.8 MMOL/L (ref 3.5–5.1)
PROT SERPL-MCNC: 6.2 G/DL (ref 6–8.4)
RBC # BLD AUTO: 4.14 M/UL (ref 4.6–6.2)
SODIUM SERPL-SCNC: 143 MMOL/L (ref 136–145)
WBC # BLD AUTO: 5.2 K/UL (ref 3.9–12.7)

## 2023-12-14 PROCEDURE — 99233 SBSQ HOSP IP/OBS HIGH 50: CPT | Mod: ,,, | Performed by: STUDENT IN AN ORGANIZED HEALTH CARE EDUCATION/TRAINING PROGRAM

## 2023-12-14 PROCEDURE — 63600175 PHARM REV CODE 636 W HCPCS: Performed by: STUDENT IN AN ORGANIZED HEALTH CARE EDUCATION/TRAINING PROGRAM

## 2023-12-14 PROCEDURE — 96366 THER/PROPH/DIAG IV INF ADDON: CPT

## 2023-12-14 PROCEDURE — 99233 PR SUBSEQUENT HOSPITAL CARE,LEVL III: ICD-10-PCS | Mod: ,,, | Performed by: STUDENT IN AN ORGANIZED HEALTH CARE EDUCATION/TRAINING PROGRAM

## 2023-12-14 PROCEDURE — 63600175 PHARM REV CODE 636 W HCPCS: Performed by: HOSPITALIST

## 2023-12-14 PROCEDURE — 36415 COLL VENOUS BLD VENIPUNCTURE: CPT | Performed by: STUDENT IN AN ORGANIZED HEALTH CARE EDUCATION/TRAINING PROGRAM

## 2023-12-14 PROCEDURE — 11000001 HC ACUTE MED/SURG PRIVATE ROOM

## 2023-12-14 PROCEDURE — 96361 HYDRATE IV INFUSION ADD-ON: CPT

## 2023-12-14 PROCEDURE — 85730 THROMBOPLASTIN TIME PARTIAL: CPT | Performed by: HOSPITALIST

## 2023-12-14 PROCEDURE — 96376 TX/PRO/DX INJ SAME DRUG ADON: CPT

## 2023-12-14 PROCEDURE — 82962 GLUCOSE BLOOD TEST: CPT

## 2023-12-14 PROCEDURE — 80053 COMPREHEN METABOLIC PANEL: CPT | Performed by: STUDENT IN AN ORGANIZED HEALTH CARE EDUCATION/TRAINING PROGRAM

## 2023-12-14 PROCEDURE — 25000003 PHARM REV CODE 250: Performed by: HOSPITALIST

## 2023-12-14 PROCEDURE — 25000003 PHARM REV CODE 250: Performed by: STUDENT IN AN ORGANIZED HEALTH CARE EDUCATION/TRAINING PROGRAM

## 2023-12-14 PROCEDURE — 85025 COMPLETE CBC W/AUTO DIFF WBC: CPT | Performed by: STUDENT IN AN ORGANIZED HEALTH CARE EDUCATION/TRAINING PROGRAM

## 2023-12-14 RX ADMIN — SODIUM CHLORIDE: 9 INJECTION, SOLUTION INTRAVENOUS at 11:12

## 2023-12-14 RX ADMIN — MORPHINE SULFATE 4 MG: 2 INJECTION, SOLUTION INTRAMUSCULAR; INTRAVENOUS at 09:12

## 2023-12-14 RX ADMIN — ONDANSETRON 4 MG: 2 INJECTION INTRAMUSCULAR; INTRAVENOUS at 08:12

## 2023-12-14 RX ADMIN — MORPHINE SULFATE 4 MG: 2 INJECTION, SOLUTION INTRAMUSCULAR; INTRAVENOUS at 08:12

## 2023-12-14 RX ADMIN — ONDANSETRON 4 MG: 2 INJECTION INTRAMUSCULAR; INTRAVENOUS at 02:12

## 2023-12-14 RX ADMIN — AMLODIPINE BESYLATE 10 MG: 2.5 TABLET ORAL at 08:12

## 2023-12-14 RX ADMIN — METRONIDAZOLE 500 MG: 5 INJECTION, SOLUTION INTRAVENOUS at 12:12

## 2023-12-14 RX ADMIN — METRONIDAZOLE 500 MG: 5 INJECTION, SOLUTION INTRAVENOUS at 03:12

## 2023-12-14 RX ADMIN — METRONIDAZOLE 500 MG: 5 INJECTION, SOLUTION INTRAVENOUS at 08:12

## 2023-12-14 RX ADMIN — HEPARIN SODIUM 18.04 UNITS/KG/HR: 10000 INJECTION, SOLUTION INTRAVENOUS at 05:12

## 2023-12-14 RX ADMIN — CEFTRIAXONE 1 G: 1 INJECTION, POWDER, FOR SOLUTION INTRAMUSCULAR; INTRAVENOUS at 07:12

## 2023-12-14 RX ADMIN — SODIUM CHLORIDE: 9 INJECTION, SOLUTION INTRAVENOUS at 09:12

## 2023-12-14 RX ADMIN — MORPHINE SULFATE 4 MG: 2 INJECTION, SOLUTION INTRAMUSCULAR; INTRAVENOUS at 02:12

## 2023-12-14 NOTE — SUBJECTIVE & OBJECTIVE
Interval History: MRCP obtained today to see if patient has gallstone has passed since labs are improved. Remains present. Also showed bilateral hydronephrosis and possible ileus vs obstruction. Patient states he is passing gas at this time.     Review of Systems   All other systems reviewed and are negative.    Objective:     Vital Signs (Most Recent):  Temp: 98 °F (36.7 °C) (12/14/23 0753)  Pulse: 89 (12/14/23 1336)  Resp: 18 (12/14/23 1428)  BP: (!) 161/109 (12/14/23 1336)  SpO2: (!) 94 % (12/14/23 1336) Vital Signs (24h Range):  Temp:  [97.4 °F (36.3 °C)-98.1 °F (36.7 °C)] 98 °F (36.7 °C)  Pulse:  [] 89  Resp:  [18-20] 18  SpO2:  [94 %-99 %] 94 %  BP: (122-161)/() 161/109     Weight: 90.7 kg (200 lb)  Body mass index is 29.53 kg/m².    Intake/Output Summary (Last 24 hours) at 12/14/2023 1516  Last data filed at 12/14/2023 1356  Gross per 24 hour   Intake 100 ml   Output 1650 ml   Net -1550 ml         Physical Exam      Vitals reviewed  General: NAD, Well developed, Well Nourished  Head: NC/AT  Eyes: EOMI, MANOJ  Cardiovascular: Pulses intact distally, Regular Rate and rhythm  Pulmonary: Normal Respiratory Rate, No respiratory distress  Gi: Soft, Non-tender, ostomy bag in place  Extremities: Warm, No edema present  Skin: Warm, dry  Neuro: Alert, Oriented x3, No focal Deficit  Psych: Appropriate mood and affect      Significant Labs: All pertinent labs within the past 24 hours have been reviewed.    Significant Imaging: I have reviewed all pertinent imaging results/findings within the past 24 hours.

## 2023-12-14 NOTE — NURSING
Request from pt to transfer [place]  Possibly [place]. Transfer Center notified.

## 2023-12-14 NOTE — ED NOTES
Care assumed of pt. Pt Aox4; respirations even, unlabored. Heparin gtt infusing at therapeutic rate. Pt denies any needs at this time. Awaiting bed assignment.

## 2023-12-14 NOTE — PLAN OF CARE
Problem: Adult Inpatient Plan of Care  Goal: Plan of Care Review  Outcome: Ongoing, Progressing     Problem: Adult Inpatient Plan of Care  Goal: Patient-Specific Goal (Individualized)  Outcome: Ongoing, Progressing     Problem: Adult Inpatient Plan of Care  Goal: Absence of Hospital-Acquired Illness or Injury  Outcome: Ongoing, Progressing     Problem: Adult Inpatient Plan of Care  Goal: Optimal Comfort and Wellbeing  Outcome: Ongoing, Progressing     Problem: Adult Inpatient Plan of Care  Goal: Readiness for Transition of Care  Outcome: Ongoing, Progressing     Problem: Infection  Goal: Absence of Infection Signs and Symptoms  Outcome: Ongoing, Progressing     Problem: Pain Acute  Goal: Acceptable Pain Control and Functional Ability  Outcome: Ongoing, Progressing     Problem: VTE (Venous Thromboembolism)  Goal: VTE (Venous Thromboembolism) Symptom Resolution  Outcome: Ongoing, Progressing

## 2023-12-14 NOTE — ASSESSMENT & PLAN NOTE
Continue amlodipine at 5 mg daily   Follow up with PCP as scheduled  May need an alternative antihypertensive agent due to LE swelling      Left message for pt to call me back.        ----- Message from Mary Riki sent at 6/20/2022  2:14 PM CDT -----  Regarding: self  .Type: Patient Call Back    Who called: self     What is the request in detail: states she has pre op questions - please call     Can the clinic reply by [facility]? No     Would the patient rather a call back or a response via [facility]?  Call     Best call back number: .[phone]             no

## 2023-12-14 NOTE — PLAN OF CARE
[facility] Emergency Dept  Discharge Reassessment    Primary Care Provider: No, Primary Doctor    Expected Discharge Date:     Case Management continuing to follow and will assist with discharge planning as needed. Patient accepted at [facility] pending bed availability. MRCP completed today and showed mild to mod bilateral hydronephrosis and development of small bowl dilation VS ileus. Pt on heparin gtt, IVAB, and flagyl      Reassessment (most recent)       Discharge Reassessment - 12/14/23 1432          Discharge Reassessment    Assessment Type Discharge Planning Reassessment     Discharge Plan A Hospital Transfer     Transition of Care Barriers None     Why the patient remains in the hospital Acute bed availability

## 2023-12-14 NOTE — PROGRESS NOTES
[facility] Emergency Dept  [facility] Medicine  Progress Note    Patient Name: Chase De La O  MRN: [id number]  Patient Class: IP- Inpatient   Admission Date: 12/12/2023  Length of Stay: 1 days  Attending Physician: Everett Villegas MD  Primary Care Provider: Janny, Primary Doctor        Subjective:     Principal Problem:Dilation of biliary tract        HPI:  The patient is a 57 y/o male with PMH of HTN, colon ca s/p colectomy and DVT who presented with RUQ abdominal pain which started on the am of 12/12 and woke him from his sleep. Patient is accompanied by his friend who also provides some of the history. He had associated nausea and vomiting. [place] to [place]. Work up in the ER showed on CT, CBD and gallbladder distention to 5 mm but no stone visible. Pain controlled with morphine. No fevers reported. Patient was recently diagnosed with RLE DVT and is on apixaban. Patient has been accepted for transfer to [facility] in [place] but is awaiting a bed.     Overview/Hospital Course:  No notes on file    Interval History: MRCP obtained today to see if patient has gallstone has passed since labs are improved. Remains present. Also showed bilateral hydronephrosis and possible ileus vs obstruction. Patient states he is passing gas at this time.     Review of Systems   All other systems reviewed and are negative.    Objective:     Vital Signs (Most Recent):  Temp: 98 °F (36.7 °C) (12/14/23 0753)  Pulse: 89 (12/14/23 1336)  Resp: 18 (12/14/23 1428)  BP: (!) 161/109 (12/14/23 1336)  SpO2: (!) 94 % (12/14/23 1336) Vital Signs (24h Range):  Temp:  [97.4 °F (36.3 °C)-98.1 °F (36.7 °C)] 98 °F (36.7 °C)  Pulse:  [] 89  Resp:  [18-20] 18  SpO2:  [94 %-99 %] 94 %  BP: (122-161)/() 161/109     Weight: 90.7 kg (200 lb)  Body mass index is 29.53 kg/m².    Intake/Output Summary (Last 24 hours) at 12/14/2023 1516  Last data filed at 12/14/2023  1356  Gross per 24 hour   Intake 100 ml   Output 1650 ml   Net -1550 ml         Physical Exam      Vitals reviewed  General: NAD, Well developed, Well Nourished  Head: NC/AT  Eyes: EOMI, MANOJ  Cardiovascular: Pulses intact distally, Regular Rate and rhythm  Pulmonary: Normal Respiratory Rate, No respiratory distress  Gi: Soft, Non-tender, ostomy bag in place  Extremities: Warm, No edema present  Skin: Warm, dry  Neuro: Alert, Oriented x3, No focal Deficit  Psych: Appropriate mood and affect      Significant Labs: All pertinent labs within the past 24 hours have been reviewed.    Significant Imaging: I have reviewed all pertinent imaging results/findings within the past 24 hours.    Assessment/Plan:      * Dilation of biliary tract  Choledocholithiasis  Continue IVF, IV abx  PRN medication for pain and nausea  Awaiting a bed at [facility] in [place]        Scrotal swelling  Hydrocele noted on US during last ER visit  Follow up with urology  Scrotal elevation       Essential hypertension  Continue amlodipine at 5 mg daily   Follow up with PCP as scheduled  May need an alternative antihypertensive agent due to LE swelling       Acute deep vein thrombosis (DVT) of right lower extremity  Patient on apixaban   Case discussed with AES on call at [facility] and they advised placing the patient on a heparin drip for anticoagulation management angelina-procedure          VTE Risk Mitigation (From admission, onward)           Ordered     heparin 25,000 units in dextrose 5% (100 units/ml) IV bolus from bag - ADDITIONAL PRN BOLUS - 60 units/kg  As needed (PRN)        Question:  Heparin Infusion Adjustment (DO NOT MODIFY ANSWER)  Answer:  [email or web address]\epic\Images\Pharmacy\HeparinInfusions\heparin HIGH INTENSITY nomogram for [facility] [id number]    12/13/23 0302     heparin 25,000 units in dextrose 5% (100 units/ml) IV bolus from bag - ADDITIONAL PRN BOLUS - 30 units/kg  As needed (PRN)        Question:  Heparin  Infusion Adjustment (DO NOT MODIFY ANSWER)  Answer:  [email or web address]\epic\Images\Pharmacy\HeparinInfusions\heparin HIGH INTENSITY nomogram for [facility] [id number].pdf    12/13/23 0302     heparin 25,000 units in dextrose 5% 250 mL (100 units/mL) infusion HIGH INTENSITY nomogram - [facility]  Continuous        Question:  Begin at (units/kg/hr)  Answer:  18    12/13/23 0302     Reason for No Pharmacological VTE Prophylaxis  Once        Question:  Reasons:  Answer:  Already adequately anticoagulated on oral Anticoagulants    12/13/23 0152                    Discharge Planning   JOHNNY:      Code Status: Full Code   Is the patient medically ready for discharge?:     Reason for patient still in hospital (select all that apply): Treatment  Discharge Plan A: Hospital Transfer                  Everett Villegas MD  Department of Hospital Medicine   [facility] Emergency Dept

## 2023-12-14 NOTE — ASSESSMENT & PLAN NOTE
Choledocholithiasis  Continue IVF, IV abx  PRN medication for pain and nausea  Awaiting a bed at [facility] in [place]

## 2023-12-15 ENCOUNTER — ANESTHESIA (OUTPATIENT)
Dept: ENDOSCOPY | Facility: HOSPITAL | Age: 58
DRG: 418 | End: 2023-12-15
Payer: OTHER GOVERNMENT

## 2023-12-15 ENCOUNTER — ANESTHESIA EVENT (OUTPATIENT)
Dept: SURGERY | Facility: HOSPITAL | Age: 58
DRG: 418 | End: 2023-12-15
Payer: OTHER GOVERNMENT

## 2023-12-15 ENCOUNTER — ANESTHESIA EVENT (OUTPATIENT)
Dept: ENDOSCOPY | Facility: HOSPITAL | Age: 58
DRG: 418 | End: 2023-12-15
Payer: OTHER GOVERNMENT

## 2023-12-15 ENCOUNTER — HOSPITAL ENCOUNTER (INPATIENT)
Facility: HOSPITAL | Age: 58
LOS: 2 days | Discharge: HOME | DRG: 418 | End: 2023-12-17
Attending: HOSPITALIST | Admitting: HOSPITALIST
Payer: OTHER GOVERNMENT

## 2023-12-15 VITALS
HEART RATE: 76 BPM | SYSTOLIC BLOOD PRESSURE: 130 MMHG | OXYGEN SATURATION: 96 % | RESPIRATION RATE: 25 BRPM | BODY MASS INDEX: 30.14 KG/M2 | TEMPERATURE: 98 F | WEIGHT: 203.5 LBS | HEIGHT: 69 IN | DIASTOLIC BLOOD PRESSURE: 77 MMHG

## 2023-12-15 DIAGNOSIS — K81.0 ACUTE CHOLECYSTITIS: Primary | ICD-10-CM

## 2023-12-15 DIAGNOSIS — R07.9 CHEST PAIN: ICD-10-CM

## 2023-12-15 DIAGNOSIS — K80.50 CHOLEDOCHOLITHIASIS: ICD-10-CM

## 2023-12-15 LAB
ALBUMIN SERPL BCP-MCNC: 3 G/DL (ref 3.5–5.2)
ALP SERPL-CCNC: 522 U/L (ref 55–135)
ALT SERPL W/O P-5'-P-CCNC: 105 U/L (ref 10–44)
ANION GAP SERPL CALC-SCNC: 10 MMOL/L (ref 8–16)
APTT PPP: 27.1 SEC (ref 21–32)
APTT PPP: 29.1 SEC (ref 21–32)
AST SERPL-CCNC: 50 U/L (ref 10–40)
BASOPHILS # BLD AUTO: 0.02 K/UL (ref 0–0.2)
BASOPHILS # BLD AUTO: 0.03 K/UL (ref 0–0.2)
BASOPHILS NFR BLD: 0.5 % (ref 0–1.9)
BASOPHILS NFR BLD: 0.5 % (ref 0–1.9)
BILIRUB SERPL-MCNC: 1.3 MG/DL (ref 0.1–1)
BUN SERPL-MCNC: 17 MG/DL (ref 6–20)
CALCIUM SERPL-MCNC: 9.7 MG/DL (ref 8.7–10.5)
CHLORIDE SERPL-SCNC: 109 MMOL/L (ref 95–110)
CO2 SERPL-SCNC: 25 MMOL/L (ref 23–29)
CREAT SERPL-MCNC: 1.7 MG/DL (ref 0.5–1.4)
DIFFERENTIAL METHOD BLD: ABNORMAL
DIFFERENTIAL METHOD BLD: ABNORMAL
EOSINOPHIL # BLD AUTO: 0.1 K/UL (ref 0–0.5)
EOSINOPHIL # BLD AUTO: 0.1 K/UL (ref 0–0.5)
EOSINOPHIL NFR BLD: 1.1 % (ref 0–8)
EOSINOPHIL NFR BLD: 1.3 % (ref 0–8)
ERYTHROCYTE [DISTWIDTH] IN BLOOD BY AUTOMATED COUNT: 13.6 % (ref 11.5–14.5)
ERYTHROCYTE [DISTWIDTH] IN BLOOD BY AUTOMATED COUNT: 13.9 % (ref 11.5–14.5)
EST. GFR  (NO RACE VARIABLE): 46.2 ML/MIN/1.73 M^2
GLUCOSE SERPL-MCNC: 89 MG/DL (ref 70–110)
HCT VFR BLD AUTO: 36.1 % (ref 40–54)
HCT VFR BLD AUTO: 39.4 % (ref 40–54)
HGB BLD-MCNC: 12.3 G/DL (ref 14–18)
HGB BLD-MCNC: 13 G/DL (ref 14–18)
IMM GRANULOCYTES # BLD AUTO: 0.05 K/UL (ref 0–0.04)
IMM GRANULOCYTES # BLD AUTO: 0.06 K/UL (ref 0–0.04)
IMM GRANULOCYTES NFR BLD AUTO: 0.9 % (ref 0–0.5)
IMM GRANULOCYTES NFR BLD AUTO: 1.4 % (ref 0–0.5)
INR PPP: 1 (ref 0.8–1.2)
INR PPP: 1 (ref 0.8–1.2)
LYMPHOCYTES # BLD AUTO: 0.3 K/UL (ref 1–4.8)
LYMPHOCYTES # BLD AUTO: 0.3 K/UL (ref 1–4.8)
LYMPHOCYTES NFR BLD: 5.9 % (ref 18–48)
LYMPHOCYTES NFR BLD: 6.2 % (ref 18–48)
MAGNESIUM SERPL-MCNC: 1.8 MG/DL (ref 1.6–2.6)
MCH RBC QN AUTO: 30.7 PG (ref 27–31)
MCH RBC QN AUTO: 30.7 PG (ref 27–31)
MCHC RBC AUTO-ENTMCNC: 33 G/DL (ref 32–36)
MCHC RBC AUTO-ENTMCNC: 34.1 G/DL (ref 32–36)
MCV RBC AUTO: 90 FL (ref 82–98)
MCV RBC AUTO: 93 FL (ref 82–98)
MONOCYTES # BLD AUTO: 0.4 K/UL (ref 0.3–1)
MONOCYTES # BLD AUTO: 0.5 K/UL (ref 0.3–1)
MONOCYTES NFR BLD: 8.4 % (ref 4–15)
MONOCYTES NFR BLD: 8.5 % (ref 4–15)
NEUTROPHILS # BLD AUTO: 3.6 K/UL (ref 1.8–7.7)
NEUTROPHILS # BLD AUTO: 4.5 K/UL (ref 1.8–7.7)
NEUTROPHILS NFR BLD: 82.3 % (ref 38–73)
NEUTROPHILS NFR BLD: 83 % (ref 38–73)
NRBC BLD-RTO: 0 /100 WBC
NRBC BLD-RTO: 0 /100 WBC
PHOSPHATE SERPL-MCNC: 3.3 MG/DL (ref 2.7–4.5)
PLATELET # BLD AUTO: 182 K/UL (ref 150–450)
PLATELET # BLD AUTO: 203 K/UL (ref 150–450)
PMV BLD AUTO: 11.4 FL (ref 9.2–12.9)
PMV BLD AUTO: 11.7 FL (ref 9.2–12.9)
POTASSIUM SERPL-SCNC: 4.6 MMOL/L (ref 3.5–5.1)
PROT SERPL-MCNC: 6.7 G/DL (ref 6–8.4)
PROTHROMBIN TIME: 11 SEC (ref 9–12.5)
PROTHROMBIN TIME: 11.2 SEC (ref 9–12.5)
RBC # BLD AUTO: 4.01 M/UL (ref 4.6–6.2)
RBC # BLD AUTO: 4.24 M/UL (ref 4.6–6.2)
SODIUM SERPL-SCNC: 144 MMOL/L (ref 136–145)
WBC # BLD AUTO: 4.37 K/UL (ref 3.9–12.7)
WBC # BLD AUTO: 5.46 K/UL (ref 3.9–12.7)

## 2023-12-15 PROCEDURE — 43262 ENDO CHOLANGIOPANCREATOGRAPH: CPT | Mod: 51,,, | Performed by: INTERNAL MEDICINE

## 2023-12-15 PROCEDURE — 80053 COMPREHEN METABOLIC PANEL: CPT

## 2023-12-15 PROCEDURE — D9220A PRA ANESTHESIA: Mod: ANES,,, | Performed by: ANESTHESIOLOGY

## 2023-12-15 PROCEDURE — 84100 ASSAY OF PHOSPHORUS: CPT

## 2023-12-15 PROCEDURE — 25000003 PHARM REV CODE 250

## 2023-12-15 PROCEDURE — 94761 N-INVAS EAR/PLS OXIMETRY MLT: CPT

## 2023-12-15 PROCEDURE — D9220A PRA ANESTHESIA: ICD-10-PCS | Mod: ANES,,, | Performed by: ANESTHESIOLOGY

## 2023-12-15 PROCEDURE — 63600175 PHARM REV CODE 636 W HCPCS

## 2023-12-15 PROCEDURE — 37000009 HC ANESTHESIA EA ADD 15 MINS: Performed by: INTERNAL MEDICINE

## 2023-12-15 PROCEDURE — 85730 THROMBOPLASTIN TIME PARTIAL: CPT | Mod: 91 | Performed by: INTERNAL MEDICINE

## 2023-12-15 PROCEDURE — 37000008 HC ANESTHESIA 1ST 15 MINUTES: Performed by: INTERNAL MEDICINE

## 2023-12-15 PROCEDURE — 0F798ZZ DILATION OF COMMON BILE DUCT, VIA NATURAL OR ARTIFICIAL OPENING ENDOSCOPIC: ICD-10-PCS | Performed by: INTERNAL MEDICINE

## 2023-12-15 PROCEDURE — 63600175 PHARM REV CODE 636 W HCPCS: Performed by: NURSE ANESTHETIST, CERTIFIED REGISTERED

## 2023-12-15 PROCEDURE — 85610 PROTHROMBIN TIME: CPT

## 2023-12-15 PROCEDURE — 99238 PR HOSPITAL DISCHARGE DAY,<30 MIN: ICD-10-PCS | Mod: ,,, | Performed by: STUDENT IN AN ORGANIZED HEALTH CARE EDUCATION/TRAINING PROGRAM

## 2023-12-15 PROCEDURE — D9220A PRA ANESTHESIA: ICD-10-PCS | Mod: CRNA,,, | Performed by: NURSE ANESTHETIST, CERTIFIED REGISTERED

## 2023-12-15 PROCEDURE — 27202304 HC CANNULA, ERCP: Performed by: INTERNAL MEDICINE

## 2023-12-15 PROCEDURE — 85025 COMPLETE CBC W/AUTO DIFF WBC: CPT

## 2023-12-15 PROCEDURE — 63600175 PHARM REV CODE 636 W HCPCS: Performed by: INTERNAL MEDICINE

## 2023-12-15 PROCEDURE — 36415 COLL VENOUS BLD VENIPUNCTURE: CPT | Performed by: INTERNAL MEDICINE

## 2023-12-15 PROCEDURE — 43264 ERCP REMOVE DUCT CALCULI: CPT | Mod: ,,, | Performed by: INTERNAL MEDICINE

## 2023-12-15 PROCEDURE — 27201674 HC SPHINCTERTOME: Performed by: INTERNAL MEDICINE

## 2023-12-15 PROCEDURE — 83735 ASSAY OF MAGNESIUM: CPT

## 2023-12-15 PROCEDURE — 74328 X-RAY BILE DUCT ENDOSCOPY: CPT | Mod: 26,,, | Performed by: INTERNAL MEDICINE

## 2023-12-15 PROCEDURE — 99223 1ST HOSP IP/OBS HIGH 75: CPT | Mod: 25,ICN,, | Performed by: INTERNAL MEDICINE

## 2023-12-15 PROCEDURE — D9220A PRA ANESTHESIA: Mod: CRNA,,, | Performed by: NURSE ANESTHETIST, CERTIFIED REGISTERED

## 2023-12-15 PROCEDURE — 43264 ERCP REMOVE DUCT CALCULI: CPT | Performed by: INTERNAL MEDICINE

## 2023-12-15 PROCEDURE — 25500020 PHARM REV CODE 255: Performed by: INTERNAL MEDICINE

## 2023-12-15 PROCEDURE — 85610 PROTHROMBIN TIME: CPT | Mod: 91 | Performed by: INTERNAL MEDICINE

## 2023-12-15 PROCEDURE — 36415 COLL VENOUS BLD VENIPUNCTURE: CPT

## 2023-12-15 PROCEDURE — 27202125 HC BALLOON, EXTRACTION (ANY): Performed by: INTERNAL MEDICINE

## 2023-12-15 PROCEDURE — 85025 COMPLETE CBC W/AUTO DIFF WBC: CPT | Mod: 91 | Performed by: INTERNAL MEDICINE

## 2023-12-15 PROCEDURE — 85730 THROMBOPLASTIN TIME PARTIAL: CPT

## 2023-12-15 PROCEDURE — 99238 HOSP IP/OBS DSCHRG MGMT 30/<: CPT | Mod: ,,, | Performed by: STUDENT IN AN ORGANIZED HEALTH CARE EDUCATION/TRAINING PROGRAM

## 2023-12-15 PROCEDURE — 20600001 HC STEP DOWN PRIVATE ROOM

## 2023-12-15 PROCEDURE — 25000003 PHARM REV CODE 250: Performed by: NURSE ANESTHETIST, CERTIFIED REGISTERED

## 2023-12-15 PROCEDURE — 43262 ENDO CHOLANGIOPANCREATOGRAPH: CPT | Mod: 59 | Performed by: INTERNAL MEDICINE

## 2023-12-15 PROCEDURE — 99223 1ST HOSP IP/OBS HIGH 75: CPT | Mod: 57,,, | Performed by: SURGERY

## 2023-12-15 PROCEDURE — C1769 GUIDE WIRE: HCPCS | Performed by: INTERNAL MEDICINE

## 2023-12-15 PROCEDURE — 74328 X-RAY BILE DUCT ENDOSCOPY: CPT | Mod: TC | Performed by: INTERNAL MEDICINE

## 2023-12-15 RX ORDER — MORPHINE SULFATE 4 MG/ML
4 INJECTION, SOLUTION INTRAMUSCULAR; INTRAVENOUS ONCE AS NEEDED
Status: DISCONTINUED | OUTPATIENT
Start: 2023-12-15 | End: 2023-12-15

## 2023-12-15 RX ORDER — MORPHINE SULFATE 4 MG/ML
4 INJECTION, SOLUTION INTRAMUSCULAR; INTRAVENOUS EVERY 6 HOURS PRN
Status: DISCONTINUED | OUTPATIENT
Start: 2023-12-15 | End: 2023-12-15

## 2023-12-15 RX ORDER — ACETAMINOPHEN 325 MG/1
650 TABLET ORAL EVERY 6 HOURS PRN
Status: DISCONTINUED | OUTPATIENT
Start: 2023-12-15 | End: 2023-12-17 | Stop reason: HOSPADM

## 2023-12-15 RX ORDER — MORPHINE SULFATE 4 MG/ML
4 INJECTION, SOLUTION INTRAMUSCULAR; INTRAVENOUS ONCE AS NEEDED
Status: COMPLETED | OUTPATIENT
Start: 2023-12-15 | End: 2023-12-15

## 2023-12-15 RX ORDER — IBUPROFEN 200 MG
16 TABLET ORAL
Status: DISCONTINUED | OUTPATIENT
Start: 2023-12-15 | End: 2023-12-17 | Stop reason: HOSPADM

## 2023-12-15 RX ORDER — GLUCAGON 1 MG
1 KIT INJECTION
Status: DISCONTINUED | OUTPATIENT
Start: 2023-12-15 | End: 2023-12-17 | Stop reason: HOSPADM

## 2023-12-15 RX ORDER — IBUPROFEN 200 MG
24 TABLET ORAL
Status: DISCONTINUED | OUTPATIENT
Start: 2023-12-15 | End: 2023-12-17 | Stop reason: HOSPADM

## 2023-12-15 RX ORDER — MORPHINE SULFATE 2 MG/ML
2 INJECTION, SOLUTION INTRAMUSCULAR; INTRAVENOUS EVERY 6 HOURS PRN
Status: DISCONTINUED | OUTPATIENT
Start: 2023-12-15 | End: 2023-12-15

## 2023-12-15 RX ORDER — METRONIDAZOLE 500 MG/100ML
500 INJECTION, SOLUTION INTRAVENOUS
Status: DISCONTINUED | OUTPATIENT
Start: 2023-12-15 | End: 2023-12-17

## 2023-12-15 RX ORDER — MORPHINE SULFATE 2 MG/ML
2 INJECTION, SOLUTION INTRAMUSCULAR; INTRAVENOUS ONCE AS NEEDED
Status: DISCONTINUED | OUTPATIENT
Start: 2023-12-15 | End: 2023-12-15

## 2023-12-15 RX ORDER — SODIUM CHLORIDE 9 MG/ML
INJECTION, SOLUTION INTRAVENOUS CONTINUOUS
Status: ACTIVE | OUTPATIENT
Start: 2023-12-15 | End: 2023-12-15

## 2023-12-15 RX ORDER — LIDOCAINE HYDROCHLORIDE 20 MG/ML
INJECTION INTRAVENOUS
Status: DISCONTINUED | OUTPATIENT
Start: 2023-12-15 | End: 2023-12-15

## 2023-12-15 RX ORDER — HEPARIN SODIUM,PORCINE/D5W 25000/250
0-40 INTRAVENOUS SOLUTION INTRAVENOUS CONTINUOUS
Status: DISCONTINUED | OUTPATIENT
Start: 2023-12-15 | End: 2023-12-15

## 2023-12-15 RX ORDER — FENTANYL CITRATE 50 UG/ML
INJECTION, SOLUTION INTRAMUSCULAR; INTRAVENOUS
Status: DISCONTINUED | OUTPATIENT
Start: 2023-12-15 | End: 2023-12-15

## 2023-12-15 RX ORDER — OXYCODONE HYDROCHLORIDE 5 MG/1
5 TABLET ORAL EVERY 6 HOURS PRN
Status: DISCONTINUED | OUTPATIENT
Start: 2023-12-15 | End: 2023-12-17 | Stop reason: HOSPADM

## 2023-12-15 RX ORDER — HEPARIN SODIUM,PORCINE/D5W 25000/250
0-40 INTRAVENOUS SOLUTION INTRAVENOUS CONTINUOUS
Status: DISCONTINUED | OUTPATIENT
Start: 2023-12-15 | End: 2023-12-16

## 2023-12-15 RX ORDER — AMLODIPINE BESYLATE 10 MG/1
10 TABLET ORAL DAILY
Status: DISCONTINUED | OUTPATIENT
Start: 2023-12-15 | End: 2023-12-17 | Stop reason: HOSPADM

## 2023-12-15 RX ORDER — HYDROMORPHONE HYDROCHLORIDE 1 MG/ML
0.5 INJECTION, SOLUTION INTRAMUSCULAR; INTRAVENOUS; SUBCUTANEOUS EVERY 6 HOURS PRN
Status: DISCONTINUED | OUTPATIENT
Start: 2023-12-15 | End: 2023-12-17 | Stop reason: HOSPADM

## 2023-12-15 RX ORDER — PROPOFOL 10 MG/ML
VIAL (ML) INTRAVENOUS
Status: DISCONTINUED | OUTPATIENT
Start: 2023-12-15 | End: 2023-12-15

## 2023-12-15 RX ORDER — NALOXONE HCL 0.4 MG/ML
0.02 VIAL (ML) INJECTION
Status: DISCONTINUED | OUTPATIENT
Start: 2023-12-15 | End: 2023-12-17 | Stop reason: HOSPADM

## 2023-12-15 RX ORDER — OXYCODONE AND ACETAMINOPHEN 5; 325 MG/1; MG/1
1 TABLET ORAL EVERY 4 HOURS PRN
Status: DISCONTINUED | OUTPATIENT
Start: 2023-12-15 | End: 2023-12-15

## 2023-12-15 RX ORDER — SODIUM CHLORIDE 0.9 % (FLUSH) 0.9 %
10 SYRINGE (ML) INJECTION EVERY 12 HOURS PRN
Status: DISCONTINUED | OUTPATIENT
Start: 2023-12-15 | End: 2023-12-17 | Stop reason: HOSPADM

## 2023-12-15 RX ADMIN — HEPARIN SODIUM 18 UNITS/KG/HR: 10000 INJECTION, SOLUTION INTRAVENOUS at 10:12

## 2023-12-15 RX ADMIN — FENTANYL CITRATE 50 MCG: 50 INJECTION, SOLUTION INTRAMUSCULAR; INTRAVENOUS at 02:12

## 2023-12-15 RX ADMIN — MORPHINE SULFATE 4 MG: 4 INJECTION INTRAVENOUS at 09:12

## 2023-12-15 RX ADMIN — METRONIDAZOLE 500 MG: 500 INJECTION, SOLUTION INTRAVENOUS at 09:12

## 2023-12-15 RX ADMIN — LIDOCAINE HYDROCHLORIDE 100 MG: 20 INJECTION INTRAVENOUS at 02:12

## 2023-12-15 RX ADMIN — AMLODIPINE BESYLATE 10 MG: 10 TABLET ORAL at 08:12

## 2023-12-15 RX ADMIN — CEFTRIAXONE SODIUM 1 G: 1 INJECTION, POWDER, FOR SOLUTION INTRAMUSCULAR; INTRAVENOUS at 09:12

## 2023-12-15 RX ADMIN — METRONIDAZOLE 500 MG: 500 INJECTION, SOLUTION INTRAVENOUS at 04:12

## 2023-12-15 RX ADMIN — PROPOFOL 100 MG: 10 INJECTION, EMULSION INTRAVENOUS at 02:12

## 2023-12-15 RX ADMIN — SODIUM CHLORIDE: 0.9 INJECTION, SOLUTION INTRAVENOUS at 02:12

## 2023-12-15 RX ADMIN — SODIUM CHLORIDE: 9 INJECTION, SOLUTION INTRAVENOUS at 08:12

## 2023-12-15 RX ADMIN — GLYCOPYRROLATE 0.2 MG: 0.2 INJECTION, SOLUTION INTRAMUSCULAR; INTRAVENOUS at 02:12

## 2023-12-15 RX ADMIN — SODIUM CHLORIDE: 9 INJECTION, SOLUTION INTRAVENOUS at 04:12

## 2023-12-15 NOTE — ASSESSMENT & PLAN NOTE
Chronic, controlled. Latest blood pressure and vitals reviewed-     Temp:  [97.7 °F (36.5 °C)-98.3 °F (36.8 °C)]   Pulse:  []   Resp:  [12-25]   BP: (129-164)/()   SpO2:  [94 %-99 %] .   Home meds for hypertension were reviewed and noted below.   Hypertension Medications               amLODIPine (NORVASC) 10 MG tablet Take 10 mg by mouth once daily.            While in the hospital, will manage blood pressure as follows; Continue home antihypertensive regimen - amlodipine 10 daily     Will utilize p.r.n. blood pressure medication only if patient's blood pressure greater than 180/110 and he develops symptoms such as worsening chest pain or shortness of breath.

## 2023-12-15 NOTE — ASSESSMENT & PLAN NOTE
Patient with RUQ that woke him from sleep 3 days ago. Patient AF, HDS and initial labs significant for TBili 4.4, , , . CTAP and MRI c/f biliary sludge with possible small stones. US c/f for acute cholecystitis, but no GB wall thickening noted on MRCP. Pain was well controlled with morphine at OSH. Patient stable and transferred to [facility] for AES consult and possible procedure.     - AES consult  - pain control with PRN IV morphine  - NPO  - mIVF

## 2023-12-15 NOTE — PLAN OF CARE
Problem: Adult Inpatient Plan of Care  Goal: Plan of Care Review  Outcome: Ongoing, Progressing  Goal: Patient-Specific Goal (Individualized)  Outcome: Ongoing, Progressing  Goal: Absence of Hospital-Acquired Illness or Injury  Outcome: Ongoing, Progressing  Goal: Optimal Comfort and Wellbeing  Outcome: Ongoing, Progressing  Goal: Readiness for Transition of Care  Outcome: Ongoing, Progressing     Problem: Infection  Goal: Absence of Infection Signs and Symptoms  Outcome: Ongoing, Progressing     Problem: Pain Acute  Goal: Acceptable Pain Control and Functional Ability  Outcome: Ongoing, Progressing     Problem: VTE (Venous Thromboembolism)  Goal: VTE (Venous Thromboembolism) Symptom Resolution  Outcome: Ongoing, Progressing     Problem: Hypertension Acute  Goal: Blood Pressure Within Desired Range  Outcome: Ongoing, Progressing

## 2023-12-15 NOTE — [employer]OVATION PATIENT INSTRUCTIONS
Discharge Summary/Instructions after an Endoscopic Procedure  Patient Name: Chase De La O  Patient MRN: [id number]  Patient YOB: 1965  Friday, December 15, 2023  Stu White MD  Dear patient,  As a result of recent federal legislation (The Federal Cures Act), you may   receive lab or pathology results from your procedure in your [facility]   account before your physician is able to contact you. Your physician or   their representative will relay the results to you with their   recommendations at their soonest availability.  Thank you,  RESTRICTIONS:  During your procedure today, you received medications for sedation.  These   medications may affect your judgment, balance and coordination.  Therefore,   for 24 hours, you have the following restrictions:   - DO NOT drive a car, operate machinery, make legal/financial decisions,   sign important papers or drink alcohol.    ACTIVITY:  Today: no heavy lifting, straining or running due to procedural   sedation/anesthesia.  The following day: return to full activity including work.  DIET:  Eat and drink normally unless instructed otherwise.     TREATMENT FOR COMMON SIDE EFFECTS:  - Mild abdominal pain, nausea, belching, bloating or excessive gas:  rest,   eat lightly and use a heating pad.  - Sore Throat: treat with throat lozenges and/or gargle with warm salt   water.  - Because air was used during the procedure, expelling large amounts of air   from your rectum or belching is normal.  - If a bowel prep was taken, you may not have a bowel movement for 1-3 days.    This is normal.  SYMPTOMS TO WATCH FOR AND REPORT TO YOUR PHYSICIAN:  1. Abdominal pain or bloating, other than gas cramps.  2. Chest pain.  3. Back pain.  4. Signs of infection such as: chills or fever occurring within 24 hours   after the procedure.  5. Rectal bleeding, which would show as bright red, maroon, or black stools.   (A tablespoon of blood from the rectum is not serious, especially  if   hemorrhoids are present.)  6. Vomiting.  7. Weakness or dizziness.  GO DIRECTLY TO THE NEAREST EMERGENCY ROOM IF YOU HAVE ANY OF THE FOLLOWING:      Difficulty breathing              Chills and/or fever over 101 F   Persistent vomiting and/or vomiting blood   Severe abdominal pain   Severe chest pain   Black, tarry stools   Bleeding- more than one tablespoon   Any other symptom or condition that you feel may need urgent attention  Your doctor recommends these additional instructions:  If any biopsies were taken, your doctors clinic will contact you in 1 to 2   weeks with any results.  - Return patient to hospital [facility] for ongoing care.  For questions, problems or results please call your physician - Stu White MD at Work:  [phone].  [facility] [place], EMERGENCY ROOM PHONE NUMBER: [phone]  IF A COMPLICATION OR EMERGENCY SITUATION ARISES AND YOU ARE UNABLE TO REACH   YOUR PHYSICIAN - GO DIRECTLY TO THE EMERGENCY ROOM.  Stu White MD  12/15/2023 3:06:53 PM  This report has been verified and signed electronically.  Dear patient,  As a result of recent federal legislation (The Federal Cures Act), you may   receive lab or pathology results from your procedure in your [facility]   account before your physician is able to contact you. Your physician or   their representative will relay the results to you with their   recommendations at their soonest availability.  Thank you,  PROVATION

## 2023-12-15 NOTE — NURSING
Nurses Note -- 4 Eyes      12/15/2023   6:31 AM      Skin assessed during: Transfer      [x] No Altered Skin Integrity Present    []Prevention Measures Documented      [] Yes- Altered Skin Integrity Present or Discovered   [] LDA Added if Not in Epic (Describe Wound)   [] New Altered Skin Integrity was Present on Admit and Documented in LDA   [] Wound Image Taken    Wound Care Consulted? No    Attending Nurse:  STAN Holder    Second RN/Staff Member:   STAN Quesada

## 2023-12-15 NOTE — PLAN OF CARE
[facility] - Intensive Care  Discharge Final Note    Primary Care Provider: No, Primary Doctor    Expected Discharge Date: 12/15/2023    Per notes transferred to Branden Scherer for upgrade in care.    Final Discharge Note (most recent)       Final Note - 12/15/23 0706          Final Note    Assessment Type Final Discharge Note     Anticipated Discharge Disposition Short Term Hospital     What phone number can be called within the next 1-3 days to see how you are doing after discharge? [phone]                     Important Message from Medicare

## 2023-12-15 NOTE — NURSING
Pt AAOX4, vital signs stable. NAD noted. Informed of transfer to [facility]. Personal belongings sent with Pt. Heparin infusing at 14ml/hr to 20 beto left ac. No signs or symptoms of infiltration. Amr at bedside to transport Pt.

## 2023-12-15 NOTE — SUBJECTIVE & OBJECTIVE
Past Medical History:   Diagnosis Date    Acute kidney injury     DVT (deep venous thrombosis)     Hypertension     Rectal cancer        Past Surgical History:   Procedure Laterality Date    BACK SURGERY      COLOSTOMY      NECK SURGERY      SPINE SURGERY         Review of patient's allergies indicates:   Allergen Reactions    Codeine Nausea And Vomiting       Current Facility-Administered Medications on File Prior to Encounter   Medication    [DISCONTINUED] 0.9%  NaCl infusion    [DISCONTINUED] acetaminophen tablet 650 mg    [DISCONTINUED] amLODIPine tablet 10 mg    [DISCONTINUED] cefTRIAXone (ROCEPHIN) 1 g in dextrose 5 % in water (D5W) 100 mL IVPB (MB+)    [DISCONTINUED] dextrose 10% bolus 125 mL 125 mL    [DISCONTINUED] dextrose 10% bolus 250 mL 250 mL    [DISCONTINUED] glucagon (human recombinant) injection 1 mg    [DISCONTINUED] glucagon (human recombinant) injection 1 mg    [DISCONTINUED] glucose chewable tablet 16 g    [DISCONTINUED] glucose chewable tablet 24 g    [DISCONTINUED] heparin 25,000 units in dextrose 5% (100 units/ml) IV bolus from bag - ADDITIONAL PRN BOLUS - 30 units/kg    [DISCONTINUED] heparin 25,000 units in dextrose 5% (100 units/ml) IV bolus from bag - ADDITIONAL PRN BOLUS - 60 units/kg    [DISCONTINUED] heparin 25,000 units in dextrose 5% 250 mL (100 units/mL) infusion HIGH INTENSITY nomogram - [facility]    [DISCONTINUED] insulin aspart U-100 pen 0-10 Units    [DISCONTINUED] metronidazole IVPB 500 mg    [DISCONTINUED] morphine injection 4 mg    [DISCONTINUED] naloxone 0.4 mg/mL injection 0.02 mg    [DISCONTINUED] ondansetron injection 4 mg    [DISCONTINUED] sodium chloride 0.9% flush 10 mL     Current Outpatient Medications on File Prior to Encounter   Medication Sig    amLODIPine (NORVASC) 10 MG tablet Take 10 mg by mouth once daily.    apixaban (ELIQUIS) 5 mg Tab Take 2 tablets (10 mg total) by mouth 2 (two) times daily for 7 days, THEN 1 tablet (5 mg total) 2 (two) times daily for 21 days.  (Patient taking differently: Take one tablet by mouth twice a day.)    cephALEXin (KEFLEX) 500 MG capsule Take 500 mg by mouth every 6 (six) hours.    ondansetron (ZOFRAN-ODT) 4 MG TbDL Take 1 tablet (4 mg total) by mouth every 6 (six) hours as needed.    oxyCODONE-acetaminophen (PERCOCET) 7.5-325 mg per tablet Take 1 tablet by mouth every 6 (six) hours as needed for Pain.    tamsulosin (FLOMAX) 0.4 mg Cap Take by mouth once daily.    vitamin D (VITAMIN D3) 1000 units Tab Take 50,000 Units by mouth once a week. Weekly on Mondays     Family History    None       Tobacco Use    Smoking status: Smoker, Current Status Unknown    Smokeless tobacco: Not on file   Substance and Sexual Activity    Alcohol use: Yes    Drug use: Never    Sexual activity: Not on file     Review of Systems   Constitutional:  Negative for chills and fever.   HENT:  Negative for congestion and sore throat.    Eyes:  Negative for photophobia and visual disturbance.   Respiratory:  Negative for cough and shortness of breath.    Cardiovascular:  Negative for chest pain and palpitations.   Gastrointestinal:  Positive for abdominal pain.   Genitourinary:  Positive for scrotal swelling. Negative for dysuria and hematuria.   Musculoskeletal:  Negative for arthralgias and back pain.   Skin:  Negative for rash and wound.   Neurological:  Negative for dizziness and syncope.   Psychiatric/Behavioral:  Negative for agitation and behavioral problems.      Objective:     Vital Signs (Most Recent):  Temp: 97.7 °F (36.5 °C) (12/15/23 0430)  Pulse: 87 (12/15/23 0430)  Resp: 17 (12/15/23 0430)  BP: (!) 159/90 (12/15/23 0430)  SpO2: 95 % (12/15/23 0430) Vital Signs (24h Range):  Temp:  [97.7 °F (36.5 °C)-98.3 °F (36.8 °C)] 97.7 °F (36.5 °C)  Pulse:  [] 87  Resp:  [12-25] 17  SpO2:  [94 %-99 %] 95 %  BP: (129-164)/() 159/90     Weight: 92.3 kg (203 lb 7.8 oz)  Body mass index is 30.04 kg/m².     Physical Exam  Constitutional:       General: He is not in  acute distress.     Appearance: Normal appearance. He is normal weight. He is not ill-appearing.   HENT:      Head: Normocephalic and atraumatic.   Eyes:      Extraocular Movements: Extraocular movements intact.      Conjunctiva/sclera: Conjunctivae normal.   Cardiovascular:      Rate and Rhythm: Normal rate.      Heart sounds: Normal heart sounds.   Pulmonary:      Effort: Pulmonary effort is normal. No respiratory distress.      Breath sounds: Normal breath sounds. No wheezing.   Abdominal:      General: Abdomen is flat. There is no distension.      Palpations: Abdomen is soft.      Tenderness: There is abdominal tenderness (RUQ). There is no guarding.   Musculoskeletal:         General: Swelling (RLE  > LLE) present. No tenderness.      Right lower leg: No edema.      Left lower leg: No edema.   Skin:     General: Skin is warm and dry.      Coloration: Skin is not jaundiced.      Findings: No bruising.   Neurological:      General: No focal deficit present.      Mental Status: He is alert and oriented to person, place, and time.   Psychiatric:         Mood and Affect: Mood normal.         Behavior: Behavior normal.         Thought Content: Thought content normal.         Judgment: Judgment normal.                Significant Labs: All pertinent labs within the past 24 hours have been reviewed.  CBC:   Recent Labs   Lab 12/15/23  0737 12/15/23  1953 12/16/23  0503   WBC 5.46 4.37 4.54   HGB 13.0* 12.3* 12.7*   HCT 39.4* 36.1* 37.0*    203 177     CMP:   Recent Labs   Lab 12/15/23  0737 12/16/23  0503    144   K 4.6 4.2    109   CO2 25 26   GLU 89 121*   BUN 17 16   CREATININE 1.7* 1.6*   CALCIUM 9.7 9.4   PROT 6.7 6.2   ALBUMIN 3.0* 2.9*   BILITOT 1.3* 0.9   ALKPHOS 522* 445*   AST 50* 28   * 70*   ANIONGAP 10 9       Significant Imaging: I have reviewed all pertinent imaging results/findings within the past 24 hours.

## 2023-12-15 NOTE — ASSESSMENT & PLAN NOTE
Patient with acute DVT about 2 weeks ago, started on apixaban. Transitioned to heparin gtt at OSH in anticipation of possible procedure with AES for choledocholithiasis. Some residual swelling present in RLE, but no redness or tenderness present.     - continue high intensity heparin gtt   - transition back to Eliquis prior to discharge

## 2023-12-15 NOTE — HPI
Chase De La O is a 57yo male HTN, recent RLE DVT (on eliquis at home), T2DM, rectal cancer (s/p APR, chemo and radiation) who presented to the ED with severe RUQ abdominal pain. Patient says over the past 3-5 weeks he has had intermittent RUQ abdominal pain worsened by taking deep breaths. He says this pain does not present after eating but comes on intermittently and goes away on its own. Doesn't note anything that helps alleviate the pain. MRCP showed Intra and extrahepatic biliary ductal dilatation. Underwent ERCP with GI today. General Surgery was consulted for evaluation for cholecystectomy. Tbili 1.3 this morning. Patient is not tender to palpation on exam.

## 2023-12-15 NOTE — TRANSFER OF CARE
"Anesthesia Transfer of Care Note    Patient: Chase De La O    Procedure(s) Performed: Procedure(s) (LRB):  ERCP (ENDOSCOPIC RETROGRADE CHOLANGIOPANCREATOGRAPHY) (N/A)    Patient location: PACU    Anesthesia Type: general    Transport from OR: Transported from OR on room air with adequate spontaneous ventilation    Post pain: adequate analgesia    Post assessment: no apparent anesthetic complications    Post vital signs: stable    Level of consciousness: awake and alert    Nausea/Vomiting: no nausea/vomiting    Complications: none    Transfer of care protocol was followed      Last vitals: Visit Vitals  BP (!) 180/99 (Patient Position: Lying)   Pulse 95   Temp 36.9 °C (98.4 °F) (Temporal)   Resp 16   Ht 5' 9.02" (1.753 m)   Wt 92.3 kg (203 lb 7.8 oz)   SpO2 96%   BMI 30.04 kg/m²     "

## 2023-12-15 NOTE — ANESTHESIA PREPROCEDURE EVALUATION
12/15/2023  Chase De La O is a 58 y.o., male.    Past Medical History:   Diagnosis Date    Acute kidney injury     DVT (deep venous thrombosis)     Hypertension     Rectal cancer        Past Surgical History:   Procedure Laterality Date    BACK SURGERY      COLOSTOMY      NECK SURGERY      SPINE SURGERY             Pre-op Assessment          Review of Systems  Anesthesia Hx:  No problems with previous Anesthesia   History of prior surgery of interest to airway management or planning:          Denies Family Hx of Anesthesia complications.    Denies Personal Hx of Anesthesia complications.                    Cardiovascular:     Hypertension   Denies MI.              Denies GARZA.   DVT right leg, on eliquis, held x past few days but has been on heparin drip until this morning                         Pulmonary:  Pulmonary Normal    Denies Asthma.    Denies Recent URI.                 Hepatic/GI:        Abdominal pain, has not vomited recently, has not eaten in 4 days          Neurological:  Neurology Normal                                          Physical Exam  General: Alert, Oriented and Well nourished    Airway:  Mallampati: II   Mouth Opening: Normal  TM Distance: Normal  Neck ROM: Normal ROM    Dental:  Intact    Chest/Lungs:  Normal Respiratory Rate    Heart:  Rate: Normal  Rhythm: Regular Rhythm        Anesthesia Plan  Type of Anesthesia, risks & benefits discussed:    Anesthesia Type: Gen ETT, Gen Natural Airway, MAC  Intra-op Monitoring Plan: Standard ASA Monitors  Post Op Pain Control Plan: multimodal analgesia and IV/PO Opioids PRN  Informed Consent: Informed consent signed with the Patient and all parties understand the risks and agree with anesthesia plan.  All questions answered.   ASA Score: 3  Day of Surgery Review of History & Physical: H&P Update referred to the  surgeon/provider.    Ready For Surgery From Anesthesia Perspective.     .

## 2023-12-15 NOTE — ASSESSMENT & PLAN NOTE
Chase De La O is a 59yo male HTN, recent RLE DVT, T2DM, rectal cancer (s/p APR, chemo and radiation) who presented to the ED with severe RUQ abdominal pain.     - Plan for laparoscopic cholecystectomy tomorrow  - NPO at midnight  - Heparin off at midnight  - All other care per primary team  - Please call with any questions

## 2023-12-15 NOTE — HPI
Mr. De La O is a 59yo M with a h/o rectal cancer s/p colectomy with ostomy, HTN, recent RLE DVT on apixaban who presented to an OSH 3 days ago with RUQ pain that woke him up from sleep. Patient was AF, HDS. Initial labs TBili 4.4, , , . CTAP showed CBD and gallbladder distension w/o obvious evidence of a stone in the biliary tree. US with GB wall thickening and pericholecystic fluid c/f acute cholecystitis. MRCP done to monitor passage of possible stone, sludge and small stones noted, no GB wall thickening. Patient started on rocephin and flagyl, pain was controlled with morphine. [place] [facility] [place] and was transitioned to a heparin drip at OSH in anticipation of possible procedure. On [place] [facility], patient c/o RUQ pain. He denies any pain/tenderness in the RLE, but does have some residual swelling compared to his LLE. Denies chest pain, SOB, changes in ostomy output, dysuria, hematuria.     Patient is AF, HDS on 2L NC (from transport). Labs are pending. Orders for heparin gtt and mIVF due to NPO status were placed, and home amlodipine was restarted. AES consult placed.

## 2023-12-15 NOTE — SUBJECTIVE & OBJECTIVE
Past Medical History:   Diagnosis Date    Acute kidney injury     DVT (deep venous thrombosis)     Hypertension     Rectal cancer        Past Surgical History:   Procedure Laterality Date    BACK SURGERY      COLOSTOMY      NECK SURGERY      SPINE SURGERY         Review of patient's allergies indicates:   Allergen Reactions    Codeine Nausea And Vomiting       Current Facility-Administered Medications on File Prior to Encounter   Medication    [DISCONTINUED] 0.9%  NaCl infusion    [DISCONTINUED] acetaminophen tablet 650 mg    [DISCONTINUED] amLODIPine tablet 10 mg    [DISCONTINUED] cefTRIAXone (ROCEPHIN) 1 g in dextrose 5 % in water (D5W) 100 mL IVPB (MB+)    [DISCONTINUED] dextrose 10% bolus 125 mL 125 mL    [DISCONTINUED] dextrose 10% bolus 250 mL 250 mL    [DISCONTINUED] glucagon (human recombinant) injection 1 mg    [DISCONTINUED] glucagon (human recombinant) injection 1 mg    [DISCONTINUED] glucose chewable tablet 16 g    [DISCONTINUED] glucose chewable tablet 24 g    [DISCONTINUED] heparin 25,000 units in dextrose 5% (100 units/ml) IV bolus from bag - ADDITIONAL PRN BOLUS - 30 units/kg    [DISCONTINUED] heparin 25,000 units in dextrose 5% (100 units/ml) IV bolus from bag - ADDITIONAL PRN BOLUS - 60 units/kg    [DISCONTINUED] heparin 25,000 units in dextrose 5% 250 mL (100 units/mL) infusion HIGH INTENSITY nomogram - [facility]    [DISCONTINUED] insulin aspart U-100 pen 0-10 Units    [DISCONTINUED] metronidazole IVPB 500 mg    [DISCONTINUED] morphine injection 4 mg    [DISCONTINUED] naloxone 0.4 mg/mL injection 0.02 mg    [DISCONTINUED] ondansetron injection 4 mg    [DISCONTINUED] sodium chloride 0.9% flush 10 mL     Current Outpatient Medications on File Prior to Encounter   Medication Sig    amLODIPine (NORVASC) 10 MG tablet Take 10 mg by mouth once daily.    apixaban (ELIQUIS) 5 mg Tab Take 2 tablets (10 mg total) by mouth 2 (two) times daily for 7 days, THEN 1 tablet (5 mg total) 2 (two) times daily for 21 days.  (Patient taking differently: Take one tablet by mouth twice a day.)    cephALEXin (KEFLEX) 500 MG capsule Take 500 mg by mouth every 6 (six) hours.    ondansetron (ZOFRAN-ODT) 4 MG TbDL Take 1 tablet (4 mg total) by mouth every 6 (six) hours as needed.    oxyCODONE-acetaminophen (PERCOCET) 7.5-325 mg per tablet Take 1 tablet by mouth every 6 (six) hours as needed for Pain.    tamsulosin (FLOMAX) 0.4 mg Cap Take by mouth once daily.    vitamin D (VITAMIN D3) 1000 units Tab Take 50,000 Units by mouth once a week. Weekly on Mondays     Family History    None       Tobacco Use    Smoking status: Smoker, Current Status Unknown    Smokeless tobacco: Not on file   Substance and Sexual Activity    Alcohol use: Yes    Drug use: Never    Sexual activity: Not on file     Review of Systems   Constitutional:  Negative for fever.   Respiratory:  Negative for shortness of breath.    Cardiovascular:  Negative for chest pain.   Gastrointestinal:  Positive for abdominal pain and nausea (intermittent). Negative for blood in stool, constipation, diarrhea and vomiting.   Genitourinary:  Positive for scrotal swelling (known hydrocele). Negative for dysuria and hematuria.   Hematological:  Does not bruise/bleed easily.     Objective:     Vital Signs (Most Recent):  Temp: 97.7 °F (36.5 °C) (12/15/23 0430)  Pulse: 87 (12/15/23 0430)  Resp: 17 (12/15/23 0430)  BP: (!) 159/90 (12/15/23 0430)  SpO2: 95 % (12/15/23 0430) Vital Signs (24h Range):  Temp:  [97.7 °F (36.5 °C)-98.3 °F (36.8 °C)] 97.7 °F (36.5 °C)  Pulse:  [] 87  Resp:  [12-25] 17  SpO2:  [94 %-99 %] 95 %  BP: (129-164)/() 159/90        There is no height or weight on file to calculate BMI.     Physical Exam  Constitutional:       General: He is not in acute distress.     Appearance: Normal appearance. He is normal weight. He is not ill-appearing.   HENT:      Head: Normocephalic and atraumatic.   Eyes:      Extraocular Movements: Extraocular movements intact.       Conjunctiva/sclera: Conjunctivae normal.   Cardiovascular:      Rate and Rhythm: Normal rate.      Heart sounds: Normal heart sounds.   Pulmonary:      Effort: Pulmonary effort is normal. No respiratory distress.      Breath sounds: Normal breath sounds. No wheezing.   Abdominal:      General: Abdomen is flat. There is no distension.      Palpations: Abdomen is soft.      Tenderness: There is abdominal tenderness (RUQ). There is no guarding.   Musculoskeletal:         General: Swelling (RLE  > LLE) present. No tenderness.      Right lower leg: No edema.      Left lower leg: No edema.   Skin:     General: Skin is warm and dry.      Coloration: Skin is not jaundiced.      Findings: No bruising.   Neurological:      General: No focal deficit present.      Mental Status: He is alert and oriented to person, place, and time.   Psychiatric:         Mood and Affect: Mood normal.         Behavior: Behavior normal.         Thought Content: Thought content normal.         Judgment: Judgment normal.                Significant Labs: All pertinent labs within the past 24 hours have been reviewed.  CBC:   Recent Labs   Lab 12/14/23  0627   WBC 5.20   HGB 12.6*   HCT 38.2*        CMP:   Recent Labs   Lab 12/14/23  0628      K 3.8      CO2 22*      BUN 15   CREATININE 1.8*   CALCIUM 9.4   PROT 6.2   ALBUMIN 3.0*   BILITOT 1.4*   ALKPHOS 471*   AST 77*   *   ANIONGAP 13     Coagulation:   Recent Labs   Lab 12/14/23  0321   APTT 45.4*       Significant Imaging: I have reviewed all pertinent imaging results/findings within the past 24 hours.

## 2023-12-15 NOTE — H&P
[facility] - Telemetry [facility] Medicine  History & Physical    Patient Name: Chase De La O  MRN: [id number]  Patient Class: IP- Inpatient  Admission Date: 12/15/2023  Attending Physician: Lizabeth Guo MD   Primary Care Provider: Janny, Primary Doctor         Patient information was obtained from patient, past medical records, and ER records.     Subjective:     Principal Problem:Choledocholithiasis    Chief Complaint: No chief complaint on file.       HPI: Mr. De La O is a 57yo M with a h/o rectal cancer s/p colectomy with ostomy, HTN, recent RLE DVT on apixaban who presented to an OSH 3 days ago with RUQ pain that woke him up from sleep. Patient was AF, HDS. Initial labs TBili 4.4, , , . CTAP showed CBD and gallbladder distension w/o obvious evidence of a stone in the biliary tree. US with GB wall thickening and pericholecystic fluid c/f acute cholecystitis. MRCP done to monitor passage of possible stone, sludge and small stones noted, no GB wall thickening. Patient started on rocephin and flagyl, pain was controlled with morphine. He was transferred to [facility] for [facility] evaluation and was transitioned to a heparin drip at OSH in anticipation of possible procedure. On arrival to C, patient c/o RUQ pain. He denies any pain/tenderness in the RLE, but does have some residual swelling compared to his LLE. Denies chest pain, SOB, changes in ostomy output, dysuria, hematuria.     Patient is AF, HDS on 2L NC (from transport). Labs are pending. Orders for heparin gtt and mIVF due to NPO status were placed, and home amlodipine was restarted. AES consult placed.     Past Medical History:   Diagnosis Date    Acute kidney injury     DVT (deep venous thrombosis)     Hypertension     Rectal cancer        Past Surgical History:   Procedure Laterality Date    BACK SURGERY      COLOSTOMY      NECK SURGERY      SPINE SURGERY         Review of patient's allergies indicates:   Allergen  Reactions    Codeine Nausea And Vomiting       Current Facility-Administered Medications on File Prior to Encounter   Medication    [DISCONTINUED] 0.9%  NaCl infusion    [DISCONTINUED] acetaminophen tablet 650 mg    [DISCONTINUED] amLODIPine tablet 10 mg    [DISCONTINUED] cefTRIAXone (ROCEPHIN) 1 g in dextrose 5 % in water (D5W) 100 mL IVPB (MB+)    [DISCONTINUED] dextrose 10% bolus 125 mL 125 mL    [DISCONTINUED] dextrose 10% bolus 250 mL 250 mL    [DISCONTINUED] glucagon (human recombinant) injection 1 mg    [DISCONTINUED] glucagon (human recombinant) injection 1 mg    [DISCONTINUED] glucose chewable tablet 16 g    [DISCONTINUED] glucose chewable tablet 24 g    [DISCONTINUED] heparin 25,000 units in dextrose 5% (100 units/ml) IV bolus from bag - ADDITIONAL PRN BOLUS - 30 units/kg    [DISCONTINUED] heparin 25,000 units in dextrose 5% (100 units/ml) IV bolus from bag - ADDITIONAL PRN BOLUS - 60 units/kg    [DISCONTINUED] heparin 25,000 units in dextrose 5% 250 mL (100 units/mL) infusion HIGH INTENSITY nomogram - [facility]    [DISCONTINUED] insulin aspart U-100 pen 0-10 Units    [DISCONTINUED] metronidazole IVPB 500 mg    [DISCONTINUED] morphine injection 4 mg    [DISCONTINUED] naloxone 0.4 mg/mL injection 0.02 mg    [DISCONTINUED] ondansetron injection 4 mg    [DISCONTINUED] sodium chloride 0.9% flush 10 mL     Current Outpatient Medications on File Prior to Encounter   Medication Sig    amLODIPine (NORVASC) 10 MG tablet Take 10 mg by mouth once daily.    apixaban (ELIQUIS) 5 mg Tab Take 2 tablets (10 mg total) by mouth 2 (two) times daily for 7 days, THEN 1 tablet (5 mg total) 2 (two) times daily for 21 days. (Patient taking differently: Take one tablet by mouth twice a day.)    cephALEXin (KEFLEX) 500 MG capsule Take 500 mg by mouth every 6 (six) hours.    ondansetron (ZOFRAN-ODT) 4 MG TbDL Take 1 tablet (4 mg total) by mouth every 6 (six) hours as needed.    oxyCODONE-acetaminophen (PERCOCET) 7.5-325 mg per tablet Take  1 tablet by mouth every 6 (six) hours as needed for Pain.    tamsulosin (FLOMAX) 0.4 mg Cap Take by mouth once daily.    vitamin D (VITAMIN D3) 1000 units Tab Take 50,000 Units by mouth once a week. Weekly on Mondays     Family History    None       Tobacco Use    Smoking status: Smoker, Current Status Unknown    Smokeless tobacco: Not on file   Substance and Sexual Activity    Alcohol use: Yes    Drug use: Never    Sexual activity: Not on file     Review of Systems   Constitutional:  Negative for fever.   Respiratory:  Negative for shortness of breath.    Cardiovascular:  Negative for chest pain.   Gastrointestinal:  Positive for abdominal pain and nausea (intermittent). Negative for blood in stool, constipation, diarrhea and vomiting.   Genitourinary:  Positive for scrotal swelling (known hydrocele). Negative for dysuria and hematuria.   Hematological:  Does not bruise/bleed easily.     Objective:     Vital Signs (Most Recent):  Temp: 97.7 °F (36.5 °C) (12/15/23 0430)  Pulse: 87 (12/15/23 0430)  Resp: 17 (12/15/23 0430)  BP: (!) 159/90 (12/15/23 0430)  SpO2: 95 % (12/15/23 0430) Vital Signs (24h Range):  Temp:  [97.7 °F (36.5 °C)-98.3 °F (36.8 °C)] 97.7 °F (36.5 °C)  Pulse:  [] 87  Resp:  [12-25] 17  SpO2:  [94 %-99 %] 95 %  BP: (129-164)/() 159/90        There is no height or weight on file to calculate BMI.     Physical Exam  Constitutional:       General: He is not in acute distress.     Appearance: Normal appearance. He is normal weight. He is not ill-appearing.   HENT:      Head: Normocephalic and atraumatic.   Eyes:      Extraocular Movements: Extraocular movements intact.      Conjunctiva/sclera: Conjunctivae normal.   Cardiovascular:      Rate and Rhythm: Normal rate.      Heart sounds: Normal heart sounds.   Pulmonary:      Effort: Pulmonary effort is normal. No respiratory distress.      Breath sounds: Normal breath sounds. No wheezing.   Abdominal:      General: Abdomen is flat. There is no  distension.      Palpations: Abdomen is soft.      Tenderness: There is abdominal tenderness (RUQ). There is no guarding.   Musculoskeletal:         General: Swelling (RLE  > LLE) present. No tenderness.      Right lower leg: No edema.      Left lower leg: No edema.   Skin:     General: Skin is warm and dry.      Coloration: Skin is not jaundiced.      Findings: No bruising.   Neurological:      General: No focal deficit present.      Mental Status: He is alert and oriented to person, place, and time.   Psychiatric:         Mood and Affect: Mood normal.         Behavior: Behavior normal.         Thought Content: Thought content normal.         Judgment: Judgment normal.                Significant Labs: All pertinent labs within the past 24 hours have been reviewed.  CBC:   Recent Labs   Lab 12/14/23  0627   WBC 5.20   HGB 12.6*   HCT 38.2*        CMP:   Recent Labs   Lab 12/14/23  0628      K 3.8      CO2 22*      BUN 15   CREATININE 1.8*   CALCIUM 9.4   PROT 6.2   ALBUMIN 3.0*   BILITOT 1.4*   ALKPHOS 471*   AST 77*   *   ANIONGAP 13     Coagulation:   Recent Labs   Lab 12/14/23  0321   APTT 45.4*       Significant Imaging: I have reviewed all pertinent imaging results/findings within the past 24 hours.  Assessment/Plan:     * Dilation of biliary tract  Patient with RUQ that woke him from sleep 3 days ago. Patient AF, HDS and initial labs significant for TBili 4.4, , , . CTAP and MRI c/f biliary sludge with possible small stones. US c/f for acute cholecystitis, but no GB wall thickening noted on MRCP. Pain was well controlled with morphine at OSH, IV abx given. Patient stable and transferred to [facility] for AES consult and possible procedure.     - AES consult  - continue rocephin and flagyl   - pain control with PRN IV morphine  - NPO  - mIVF       Scrotal swelling  Bilateral hydroceles worked up at OSH - patient to f/u with urology outpatient.       Essential  hypertension  Chronic, controlled. Latest blood pressure and vitals reviewed-     Temp:  [97.7 °F (36.5 °C)-98.3 °F (36.8 °C)]   Pulse:  []   Resp:  [12-25]   BP: (129-164)/()   SpO2:  [94 %-99 %] .   Home meds for hypertension were reviewed and noted below.   Hypertension Medications               amLODIPine (NORVASC) 10 MG tablet Take 10 mg by mouth once daily.            While in the hospital, will manage blood pressure as follows; Continue home antihypertensive regimen - amlodipine 10 daily     Will utilize p.r.n. blood pressure medication only if patient's blood pressure greater than 180/110 and he develops symptoms such as worsening chest pain or shortness of breath.    Acute deep vein thrombosis (DVT) of right lower extremity  Patient with acute DVT about 2 weeks ago, started on apixaban. Transitioned to heparin gtt at OSH in anticipation of possible procedure with AES for choledocholithiasis. Some residual swelling present in RLE, but no redness or tenderness present.     - continue high intensity heparin gtt   - transition back to Eliquis prior to discharge         VTE Risk Mitigation (From admission, onward)           Ordered     heparin 25,000 units in dextrose 5% (100 units/ml) IV bolus from bag - ADDITIONAL PRN BOLUS - 60 units/kg  As needed (PRN)        Question:  Heparin Infusion Adjustment (DO NOT MODIFY ANSWER)  Answer:  \\[email or web address]\epic\Images\Pharmacy\HeparinInfusions\heparin HIGH INTENSITY nomogram for [facility] [id number].pdf    12/15/23 0657     heparin 25,000 units in dextrose 5% (100 units/ml) IV bolus from bag - ADDITIONAL PRN BOLUS - 30 units/kg  As needed (PRN)        Question:  Heparin Infusion Adjustment (DO NOT MODIFY ANSWER)  Answer:  \\[email or web address]\epic\Images\Pharmacy\HeparinInfusions\heparin HIGH INTENSITY nomogram for [facility] [id number].pdf    12/15/23 0657     heparin 25,000 units in dextrose 5% 250 mL (100 units/mL) infusion HIGH INTENSITY nomogram - [facility]  Continuous        Question:   Begin at (units/kg/hr)  Answer:  18    12/15/23 0657     IP VTE HIGH RISK PATIENT  Once         12/15/23 0634     Place sequential compression device  Until discontinued         12/15/23 0634                                    Shahla Alves MD  Department of Hospital Medicine  [facility] - Telemetry Stepdown

## 2023-12-15 NOTE — SUBJECTIVE & OBJECTIVE
Current Facility-Administered Medications on File Prior to Encounter   Medication    [DISCONTINUED] 0.9%  NaCl infusion    [DISCONTINUED] acetaminophen tablet 650 mg    [DISCONTINUED] amLODIPine tablet 10 mg    [DISCONTINUED] cefTRIAXone (ROCEPHIN) 1 g in dextrose 5 % in water (D5W) 100 mL IVPB (MB+)    [DISCONTINUED] dextrose 10% bolus 125 mL 125 mL    [DISCONTINUED] dextrose 10% bolus 250 mL 250 mL    [DISCONTINUED] glucagon (human recombinant) injection 1 mg    [DISCONTINUED] glucagon (human recombinant) injection 1 mg    [DISCONTINUED] glucose chewable tablet 16 g    [DISCONTINUED] glucose chewable tablet 24 g    [DISCONTINUED] heparin 25,000 units in dextrose 5% (100 units/ml) IV bolus from bag - ADDITIONAL PRN BOLUS - 30 units/kg    [DISCONTINUED] heparin 25,000 units in dextrose 5% (100 units/ml) IV bolus from bag - ADDITIONAL PRN BOLUS - 60 units/kg    [DISCONTINUED] heparin 25,000 units in dextrose 5% 250 mL (100 units/mL) infusion HIGH INTENSITY nomogram - [facility]    [DISCONTINUED] insulin aspart U-100 pen 0-10 Units    [DISCONTINUED] metronidazole IVPB 500 mg    [DISCONTINUED] morphine injection 4 mg    [DISCONTINUED] naloxone 0.4 mg/mL injection 0.02 mg    [DISCONTINUED] ondansetron injection 4 mg    [DISCONTINUED] sodium chloride 0.9% flush 10 mL     Current Outpatient Medications on File Prior to Encounter   Medication Sig    amLODIPine (NORVASC) 10 MG tablet Take 10 mg by mouth once daily.    apixaban (ELIQUIS) 5 mg Tab Take 2 tablets (10 mg total) by mouth 2 (two) times daily for 7 days, THEN 1 tablet (5 mg total) 2 (two) times daily for 21 days. (Patient taking differently: Take one tablet by mouth twice a day.)    cephALEXin (KEFLEX) 500 MG capsule Take 500 mg by mouth every 6 (six) hours.    ondansetron (ZOFRAN-ODT) 4 MG TbDL Take 1 tablet (4 mg total) by mouth every 6 (six) hours as needed.    oxyCODONE-acetaminophen (PERCOCET) 7.5-325 mg per tablet Take 1 tablet by mouth every 6 (six) hours as  needed for Pain.    tamsulosin (FLOMAX) 0.4 mg Cap Take by mouth once daily.    vitamin D (VITAMIN D3) 1000 units Tab Take 50,000 Units by mouth once a week. Weekly on Mondays       Review of patient's allergies indicates:   Allergen Reactions    Codeine Nausea And Vomiting       Past Medical History:   Diagnosis Date    Acute kidney injury     DVT (deep venous thrombosis)     Hypertension     Rectal cancer      Past Surgical History:   Procedure Laterality Date    BACK SURGERY      COLOSTOMY      NECK SURGERY      SPINE SURGERY       Family History    None       Tobacco Use    Smoking status: Smoker, Current Status Unknown    Smokeless tobacco: Not on file   Substance and Sexual Activity    Alcohol use: Yes    Drug use: Never    Sexual activity: Not on file     Review of Systems   Constitutional:  Negative for chills and fever.   Eyes:  Negative for pain and redness.   Respiratory:  Negative for chest tightness, shortness of breath and wheezing.    Cardiovascular:  Negative for chest pain and palpitations.   Gastrointestinal:  Positive for abdominal pain.   Skin:  Negative for color change and pallor.   Neurological:  Negative for light-headedness and headaches.   Psychiatric/Behavioral:  Negative for agitation and confusion.    All other systems reviewed and are negative.    Objective:     Vital Signs (Most Recent):  Temp: 97.6 °F (36.4 °C) (12/15/23 1500)  Pulse: 96 (12/15/23 1515)  Resp: (!) 24 (12/15/23 1515)  BP: (!) 152/86 (12/15/23 1545)  SpO2: 99 % (12/15/23 1515) Vital Signs (24h Range):  Temp:  [97.6 °F (36.4 °C)-98.4 °F (36.9 °C)] 97.6 °F (36.4 °C)  Pulse:  [71-99] 96  Resp:  [12-25] 24  SpO2:  [95 %-99 %] 99 %  BP: (129-180)/(77-99) 152/86     Weight: 92.3 kg (203 lb 7.8 oz)  Body mass index is 30.04 kg/m².     Physical Exam  Constitutional:       Appearance: Normal appearance.   HENT:      Head: Normocephalic and atraumatic.   Eyes:      Extraocular Movements: Extraocular movements intact.       Conjunctiva/sclera: Conjunctivae normal.   Neck:      Comments: RIJ port  Cardiovascular:      Rate and Rhythm: Normal rate and regular rhythm.   Pulmonary:      Effort: Pulmonary effort is normal. No respiratory distress.   Abdominal:      General: Abdomen is flat. There is no distension.      Palpations: Abdomen is soft. There is no mass.      Tenderness: There is no abdominal tenderness.      Comments: LLQ ostomy   Skin:     General: Skin is warm and dry.   Neurological:      General: No focal deficit present.      Mental Status: He is alert and oriented to person, place, and time.       I have reviewed all pertinent lab results within the past 24 hours.  CBC:   Recent Labs   Lab 12/15/23  0737   WBC 5.46   RBC 4.24*   HGB 13.0*   HCT 39.4*      MCV 93   MCH 30.7   MCHC 33.0     BMP:   Recent Labs   Lab 12/15/23  0737   GLU 89      K 4.6      CO2 25   BUN 17   CREATININE 1.7*   CALCIUM 9.7   MG 1.8     Coagulation:   Recent Labs   Lab 12/15/23  0737   LABPROT 11.0   INR 1.0   APTT 29.1       Significant Diagnostics:  I have reviewed all pertinent imaging results/findings within the past 24 hours.

## 2023-12-15 NOTE — NURSING
Pt back in room from procedure. Pt denies NAD and none noted. Brother at bedside. Bed at lowest, locked, sr up x 2, call light within reach. Pt instructed to call for assistance. Will cont poc

## 2023-12-15 NOTE — PLAN OF CARE
[facility] - Telemetry Stepdown  Initial Discharge Assessment       Primary Care Provider: Janny, Primary Doctor    Admission Diagnosis: Choledocholithiasis [K80.50]    Admission Date: 12/15/2023  Expected Discharge Date: 12/17/2023    Transition of Care Barriers: None    Payor: [employer] / Plan: [facility] OPTUM / Product Type: Government /     Extended Emergency Contact Information  Primary Emergency Contact: Karol Baum  Mobile Phone: [phone]  Relation: Sister  Preferred language: English   needed? No  Secondary Emergency Contact: YASMINCOLLIN REY  Mobile Phone: [phone]  Relation: Brother   needed? No    Discharge Plan A: Home with family  Discharge Plan B: Home      [facility] [facility] [place] - [address]  [address]  [place] [address]  Phone: [phone] Fax: [phone]    [facility] - [place] - [address]  [address]  [place] [address]  Phone: [phone] Fax: [phone]      Initial Assessment (most recent)       Adult Discharge Assessment - 12/15/23 1236          Discharge Assessment    Assessment Type Discharge Planning Assessment     Confirmed/corrected address, phone number and insurance Yes     Confirmed Demographics Correct on Facesheet     Source of Information patient     Does patient/caregiver understand observation status Yes     Communicated JOHNNY with patient/caregiver Yes     Reason For Admission Choledocholithiasis     People in Home alone     Do you expect to return to your current living situation? Yes     Prior to hospitilization cognitive status: Alert/Oriented     Current cognitive status: Alert/Oriented     Walking or Climbing Stairs Difficulty no     Dressing/Bathing Difficulty no     Home Layout Able to live on 1st floor     Equipment Currently Used at Home none     Readmission within 30 days? No     Patient currently being followed by outpatient case management? No     Do you take  prescription medications? Yes     Do you have prescription coverage? Yes     Coverage [place]     Do you have any problems affording any of your prescribed medications? No     Is the patient taking medications as prescribed? yes     Who is going to help you get home at discharge? Brother     How do you get to doctors appointments? car, drives self     Are you on dialysis? No     Discharge Plan A Home with family     Discharge Plan B Home     DME Needed Upon Discharge  none     Discharge Plan discussed with: Patient     Transition of Care Barriers None        Physical Activity    On average, how many days per week do you engage in moderate to strenuous exercise (like a brisk walk)? 0 days     On average, how many minutes do you engage in exercise at this level? 0 min        Financial Resource Strain    How hard is it for you to pay for the very basics like food, housing, medical care, and heating? Not hard at all        Housing Stability    In the last 12 months, was there a time when you were not able to pay the mortgage or rent on time? No     In the last 12 months, was there a time when you did not have a steady place to sleep or slept in a shelter (including now)? No        Transportation Needs    In the past 12 months, has lack of transportation kept you from medical appointments or from getting medications? No     In the past 12 months, has lack of transportation kept you from meetings, work, or from getting things needed for daily living? No        Food Insecurity    Within the past 12 months, you worried that your food would run out before you got the money to buy more. Never true     Within the past 12 months, the food you bought just didn't last and you didn't have money to get more. Never true        Stress    Do you feel stress - tense, restless, nervous, or anxious, or unable to sleep at night because your mind is troubled all the time - these days? Not at all        Social Connections    In a typical week,  how many times do you talk on the phone with family, friends, or neighbors? Twice a week     How often do you get together with friends or relatives? Once a week     How often do you attend Cheondoism or Rastafari services? 1 to 4 times per year     Do you belong to any clubs or organizations such as Che[employer]ism groups, unions, fraternal or athletic groups, or school groups? No     How often do you attend meetings of the clubs or organizations you belong to? Never     Are you , , , , never , or living with a partner? Patient declined        Alcohol Use    Q1: How often do you have a drink containing alcohol? Never     Q2: How many drinks containing alcohol do you have on a typical day when you are drinking? Patient does not drink     Q3: How often do you have six or more drinks on one occasion? Never                      SW met with Pt to discuss discharge planning.  Pt lives alone in a one story house and needs assistance with ambulation and ADLs.  Pt will have transportation and assistance from brother at discharge.  Discharge Plan A and Plan B have been determined by review of patient's clinical status, future medical and therapeutic needs, and coverage/benefits for post-acute care in coordination with multidisciplinary team members.      Saskia Pate MSW, CSW

## 2023-12-15 NOTE — NURSING
1740-patient arrived to icu 11 in [facility] from er as medical telemetry patient. Patient is aaox4. Patient oriented to room. Pt voiced understanding. Vs stable and recorded. Heparin infusing to left fa iv at 14.2ml/hr, see mar. Brother at side. CL in reach. Bed in low locked position. SR up x2

## 2023-12-15 NOTE — CONSULTS
[facility]  Gastroenterology  Consult Note    Patient Name: Chase De La O  MRN: [id number]  Admission Date: 12/15/2023  Hospital Length of Stay: 0 days  Code Status: Full Code   Attending Provider: Lizabeth Guo MD   Consulting Provider: Alek Stoner MD  Primary Care Physician: Janny, Primary Doctor  Principal Problem:Dilation of biliary tract    Inpatient consult to [facility] ([facility])  Consult performed by: Alek Stoner MD  Consult ordered by: Shahla Alves MD        Subjective:     HPI:   Mr De La O is a 59yo PMHx HTN, recent DVT on eliquis, chronic opiates, rectal adenocarcinoma s/p chemo/ RT with APR and BIGAP (2021) presented to [facility] as transfer on 12/15 for AES evaluation of CBD dilation and RUQ pain.    Still with significant abdominal pain.    VS since arrival unremarkable  CBC w/o leukocytosis, Hgb 13, plts wnl  BMP w/ Cr 1.8  LFTs w/ BR 4.4--> 1.4, --> 471, AST/ / 128--> 77/ 135  Lipase wnl  INR wnl    CTAP w/ GB distended and filled with sludge and small stones,   Layering high T1 signal material in common hepatic duct, likely sludge and perhaps tiny stones.  Similar appearing material in the lumen of the gallbladder.  Intra and extrahepatic biliary ductal dilatation.     Findings concerning for anasarca.  Probable hepatic cyst.  Mild to moderate bilateral hydronephrosis.  Development of small bowel dilatation, ileus versus obstruction.    Objective:     Vitals:    12/15/23 0739   BP: (!) 150/91   Pulse: 87   Resp: 18   Temp: 97.6 °F (36.4 °C)         Constitutional:  not in acute distress and well developed  HENT: Head: Normal, normocephalic, atraumatic.  Eyes: conjunctiva clear and sclera nonicteric  GI: soft, non-tender, without masses or organomegaly  Musculoskeletal: no muscular tenderness noted  Skin: normal color  Neurological: alert        Assessment/Plan:     59yo PMHx HTN, recent DVT on eliquis, chronic opiates, rectal  adenocarcinoma s/p chemo/ RT with APR and BIGAP (2021) [facility] [place] [facility] on 12/15 for AES evaluation of CBD dilation and RUQ pain.    Still with significant abdominal pain.    Imaging and labwork consistent with obstruction    Problem List:  Biliary obstruction    Recommendations:  ERCP today  General surgery consult for consideration of inpatient cholecystectomy    Thank you for involving us in the care of Chase De La O. Please call with any additional questions, concerns or changes in the patient's clinical status. We will continue to follow. .    Alek Stoner MD  Gastroenterology Fellow PGY VI  [facility]

## 2023-12-15 NOTE — ASSESSMENT & PLAN NOTE
Acute cholecystitis     Patient with RUQ that woke him from sleep 3 days ago. Patient AF, HDS and initial labs significant for TBili 4.4, , , . CTAP and MRI c/f biliary sludge with possible small stones. US c/f for acute cholecystitis, but no GB wall thickening noted on MRCP. Pain was well controlled with morphine at OSH, IV abx given. Patient stable and transferred to [facility] for AES consult and possible procedure.     - AES consulted, performed ERCP on 12/15 with biliary sphincterotomy and sludge clearance.  - Gen Surg consulted, performed lap solo on 12/16. Op report noted acute cholecystitis with perforation, tracey purulence around the gallbladder. Cystic duct and cystic artery separately clipped and divided.  - continue rocephin and flagyl for 24 hours after CCY.  - pain control with PRN IV dilaudid and PO oxy 5.  - Resume regular diet

## 2023-12-15 NOTE — NURSING
Call received from [facility] transfer center, spoke with Rocky.     Room # 3707  Number to call report

## 2023-12-16 ENCOUNTER — ANESTHESIA (OUTPATIENT)
Dept: SURGERY | Facility: HOSPITAL | Age: 58
DRG: 418 | End: 2023-12-16
Payer: OTHER GOVERNMENT

## 2023-12-16 PROBLEM — K81.0 ACUTE CHOLECYSTITIS: Status: ACTIVE | Noted: 2023-12-16

## 2023-12-16 LAB
ALBUMIN SERPL BCP-MCNC: 2.9 G/DL (ref 3.5–5.2)
ALP SERPL-CCNC: 445 U/L (ref 55–135)
ALT SERPL W/O P-5'-P-CCNC: 70 U/L (ref 10–44)
ANION GAP SERPL CALC-SCNC: 9 MMOL/L (ref 8–16)
APTT PPP: 69.5 SEC (ref 21–32)
AST SERPL-CCNC: 28 U/L (ref 10–40)
BASOPHILS # BLD AUTO: 0.03 K/UL (ref 0–0.2)
BASOPHILS NFR BLD: 0.7 % (ref 0–1.9)
BILIRUB SERPL-MCNC: 0.9 MG/DL (ref 0.1–1)
BUN SERPL-MCNC: 16 MG/DL (ref 6–20)
CALCIUM SERPL-MCNC: 9.4 MG/DL (ref 8.7–10.5)
CHLORIDE SERPL-SCNC: 109 MMOL/L (ref 95–110)
CO2 SERPL-SCNC: 26 MMOL/L (ref 23–29)
CREAT SERPL-MCNC: 1.6 MG/DL (ref 0.5–1.4)
DIFFERENTIAL METHOD BLD: ABNORMAL
EOSINOPHIL # BLD AUTO: 0.1 K/UL (ref 0–0.5)
EOSINOPHIL NFR BLD: 2.4 % (ref 0–8)
ERYTHROCYTE [DISTWIDTH] IN BLOOD BY AUTOMATED COUNT: 13.6 % (ref 11.5–14.5)
EST. GFR  (NO RACE VARIABLE): 49.6 ML/MIN/1.73 M^2
GLUCOSE SERPL-MCNC: 121 MG/DL (ref 70–110)
HCT VFR BLD AUTO: 37 % (ref 40–54)
HGB BLD-MCNC: 12.7 G/DL (ref 14–18)
IMM GRANULOCYTES # BLD AUTO: 0.05 K/UL (ref 0–0.04)
IMM GRANULOCYTES NFR BLD AUTO: 1.1 % (ref 0–0.5)
LYMPHOCYTES # BLD AUTO: 0.4 K/UL (ref 1–4.8)
LYMPHOCYTES NFR BLD: 7.9 % (ref 18–48)
MAGNESIUM SERPL-MCNC: 1.8 MG/DL (ref 1.6–2.6)
MCH RBC QN AUTO: 31.8 PG (ref 27–31)
MCHC RBC AUTO-ENTMCNC: 34.3 G/DL (ref 32–36)
MCV RBC AUTO: 93 FL (ref 82–98)
MONOCYTES # BLD AUTO: 0.4 K/UL (ref 0.3–1)
MONOCYTES NFR BLD: 9.5 % (ref 4–15)
NEUTROPHILS # BLD AUTO: 3.6 K/UL (ref 1.8–7.7)
NEUTROPHILS NFR BLD: 78.4 % (ref 38–73)
NRBC BLD-RTO: 0 /100 WBC
PHOSPHATE SERPL-MCNC: 3.4 MG/DL (ref 2.7–4.5)
PLATELET # BLD AUTO: 177 K/UL (ref 150–450)
PMV BLD AUTO: 11.6 FL (ref 9.2–12.9)
POTASSIUM SERPL-SCNC: 4.2 MMOL/L (ref 3.5–5.1)
PROT SERPL-MCNC: 6.2 G/DL (ref 6–8.4)
RBC # BLD AUTO: 4 M/UL (ref 4.6–6.2)
SODIUM SERPL-SCNC: 144 MMOL/L (ref 136–145)
WBC # BLD AUTO: 4.54 K/UL (ref 3.9–12.7)

## 2023-12-16 PROCEDURE — 63600175 PHARM REV CODE 636 W HCPCS

## 2023-12-16 PROCEDURE — 63600175 PHARM REV CODE 636 W HCPCS: Performed by: ANESTHESIOLOGY

## 2023-12-16 PROCEDURE — 0FT44ZZ RESECTION OF GALLBLADDER, PERCUTANEOUS ENDOSCOPIC APPROACH: ICD-10-PCS | Performed by: SURGERY

## 2023-12-16 PROCEDURE — 36415 COLL VENOUS BLD VENIPUNCTURE: CPT | Performed by: INTERNAL MEDICINE

## 2023-12-16 PROCEDURE — 36000709 HC OR TIME LEV III EA ADD 15 MIN: Performed by: SURGERY

## 2023-12-16 PROCEDURE — 85730 THROMBOPLASTIN TIME PARTIAL: CPT | Performed by: INTERNAL MEDICINE

## 2023-12-16 PROCEDURE — 84100 ASSAY OF PHOSPHORUS: CPT

## 2023-12-16 PROCEDURE — 88342 IMHCHEM/IMCYTCHM 1ST ANTB: CPT | Performed by: PATHOLOGY

## 2023-12-16 PROCEDURE — 83735 ASSAY OF MAGNESIUM: CPT

## 2023-12-16 PROCEDURE — 71000016 HC POSTOP RECOV ADDL HR: Performed by: SURGERY

## 2023-12-16 PROCEDURE — 71000033 HC RECOVERY, INTIAL HOUR: Performed by: SURGERY

## 2023-12-16 PROCEDURE — D9220A PRA ANESTHESIA: Mod: CRNA,,, | Performed by: NURSE ANESTHETIST, CERTIFIED REGISTERED

## 2023-12-16 PROCEDURE — 63600175 PHARM REV CODE 636 W HCPCS: Performed by: NURSE ANESTHETIST, CERTIFIED REGISTERED

## 2023-12-16 PROCEDURE — 27201423 OPTIME MED/SURG SUP & DEVICES STERILE SUPPLY: Performed by: SURGERY

## 2023-12-16 PROCEDURE — 37000008 HC ANESTHESIA 1ST 15 MINUTES: Performed by: SURGERY

## 2023-12-16 PROCEDURE — 80053 COMPREHEN METABOLIC PANEL: CPT

## 2023-12-16 PROCEDURE — 63600175 PHARM REV CODE 636 W HCPCS: Performed by: SURGERY

## 2023-12-16 PROCEDURE — 25000003 PHARM REV CODE 250: Performed by: NURSE ANESTHETIST, CERTIFIED REGISTERED

## 2023-12-16 PROCEDURE — 25000003 PHARM REV CODE 250

## 2023-12-16 PROCEDURE — 36000708 HC OR TIME LEV III 1ST 15 MIN: Performed by: SURGERY

## 2023-12-16 PROCEDURE — 37000009 HC ANESTHESIA EA ADD 15 MINS: Performed by: SURGERY

## 2023-12-16 PROCEDURE — 88341 IMHCHEM/IMCYTCHM EA ADD ANTB: CPT | Mod: 59 | Performed by: PATHOLOGY

## 2023-12-16 PROCEDURE — 88304 TISSUE EXAM BY PATHOLOGIST: CPT | Mod: 26,,, | Performed by: PATHOLOGY

## 2023-12-16 PROCEDURE — D9220A PRA ANESTHESIA: Mod: ANES,,, | Performed by: ANESTHESIOLOGY

## 2023-12-16 PROCEDURE — 88304 TISSUE EXAM BY PATHOLOGIST: CPT | Performed by: PATHOLOGY

## 2023-12-16 PROCEDURE — 85025 COMPLETE CBC W/AUTO DIFF WBC: CPT | Performed by: INTERNAL MEDICINE

## 2023-12-16 PROCEDURE — 88342 IMHCHEM/IMCYTCHM 1ST ANTB: CPT | Mod: 26,,, | Performed by: PATHOLOGY

## 2023-12-16 PROCEDURE — 88341 IMHCHEM/IMCYTCHM EA ADD ANTB: CPT | Mod: 26,,, | Performed by: PATHOLOGY

## 2023-12-16 PROCEDURE — D9220A PRA ANESTHESIA: ICD-10-PCS | Mod: ANES,,, | Performed by: ANESTHESIOLOGY

## 2023-12-16 PROCEDURE — 20600001 HC STEP DOWN PRIVATE ROOM

## 2023-12-16 PROCEDURE — 47562 LAPAROSCOPIC CHOLECYSTECTOMY: CPT | Mod: ICN,,, | Performed by: SURGERY

## 2023-12-16 PROCEDURE — D9220A PRA ANESTHESIA: ICD-10-PCS | Mod: CRNA,,, | Performed by: NURSE ANESTHETIST, CERTIFIED REGISTERED

## 2023-12-16 PROCEDURE — 71000015 HC POSTOP RECOV 1ST HR: Performed by: SURGERY

## 2023-12-16 RX ORDER — BUPIVACAINE HYDROCHLORIDE 5 MG/ML
INJECTION, SOLUTION EPIDURAL; INTRACAUDAL
Status: DISCONTINUED | OUTPATIENT
Start: 2023-12-16 | End: 2023-12-16 | Stop reason: HOSPADM

## 2023-12-16 RX ORDER — DEXAMETHASONE SODIUM PHOSPHATE 4 MG/ML
INJECTION, SOLUTION INTRA-ARTICULAR; INTRALESIONAL; INTRAMUSCULAR; INTRAVENOUS; SOFT TISSUE
Status: DISCONTINUED | OUTPATIENT
Start: 2023-12-16 | End: 2023-12-16

## 2023-12-16 RX ORDER — ONDANSETRON 2 MG/ML
INJECTION INTRAMUSCULAR; INTRAVENOUS
Status: DISPENSED
Start: 2023-12-16 | End: 2023-12-16

## 2023-12-16 RX ORDER — ONDANSETRON 2 MG/ML
4 INJECTION INTRAMUSCULAR; INTRAVENOUS ONCE
Status: COMPLETED | OUTPATIENT
Start: 2023-12-16 | End: 2023-12-16

## 2023-12-16 RX ORDER — LIDOCAINE HYDROCHLORIDE 20 MG/ML
INJECTION INTRAVENOUS
Status: DISCONTINUED | OUTPATIENT
Start: 2023-12-16 | End: 2023-12-16

## 2023-12-16 RX ORDER — KETAMINE HCL IN 0.9 % NACL 50 MG/5 ML
SYRINGE (ML) INTRAVENOUS
Status: DISCONTINUED | OUTPATIENT
Start: 2023-12-16 | End: 2023-12-16

## 2023-12-16 RX ORDER — MIDAZOLAM HYDROCHLORIDE 1 MG/ML
INJECTION INTRAMUSCULAR; INTRAVENOUS
Status: DISCONTINUED | OUTPATIENT
Start: 2023-12-16 | End: 2023-12-16

## 2023-12-16 RX ORDER — FENTANYL CITRATE 50 UG/ML
INJECTION, SOLUTION INTRAMUSCULAR; INTRAVENOUS
Status: DISCONTINUED | OUTPATIENT
Start: 2023-12-16 | End: 2023-12-16

## 2023-12-16 RX ORDER — HALOPERIDOL 5 MG/ML
0.5 INJECTION INTRAMUSCULAR
Status: COMPLETED | OUTPATIENT
Start: 2023-12-16 | End: 2023-12-16

## 2023-12-16 RX ORDER — ROCURONIUM BROMIDE 10 MG/ML
INJECTION, SOLUTION INTRAVENOUS
Status: DISCONTINUED | OUTPATIENT
Start: 2023-12-16 | End: 2023-12-16

## 2023-12-16 RX ORDER — HALOPERIDOL 5 MG/ML
INJECTION INTRAMUSCULAR
Status: COMPLETED
Start: 2023-12-16 | End: 2023-12-16

## 2023-12-16 RX ORDER — ONDANSETRON 2 MG/ML
INJECTION INTRAMUSCULAR; INTRAVENOUS
Status: DISCONTINUED | OUTPATIENT
Start: 2023-12-16 | End: 2023-12-16

## 2023-12-16 RX ORDER — PROPOFOL 10 MG/ML
VIAL (ML) INTRAVENOUS
Status: DISCONTINUED | OUTPATIENT
Start: 2023-12-16 | End: 2023-12-16

## 2023-12-16 RX ORDER — SODIUM CHLORIDE 9 MG/ML
INJECTION, SOLUTION INTRAVENOUS CONTINUOUS PRN
Status: DISCONTINUED | OUTPATIENT
Start: 2023-12-16 | End: 2023-12-16

## 2023-12-16 RX ORDER — PHENYLEPHRINE HYDROCHLORIDE 10 MG/ML
INJECTION INTRAVENOUS
Status: DISCONTINUED | OUTPATIENT
Start: 2023-12-16 | End: 2023-12-16

## 2023-12-16 RX ORDER — VASOPRESSIN 20 [USP'U]/ML
INJECTION, SOLUTION INTRAMUSCULAR; SUBCUTANEOUS
Status: DISCONTINUED | OUTPATIENT
Start: 2023-12-16 | End: 2023-12-16

## 2023-12-16 RX ORDER — HYDROMORPHONE HYDROCHLORIDE 1 MG/ML
0.2 INJECTION, SOLUTION INTRAMUSCULAR; INTRAVENOUS; SUBCUTANEOUS EVERY 5 MIN PRN
Status: DISCONTINUED | OUTPATIENT
Start: 2023-12-16 | End: 2023-12-16 | Stop reason: HOSPADM

## 2023-12-16 RX ADMIN — PROPOFOL 180 MG: 10 INJECTION, EMULSION INTRAVENOUS at 07:12

## 2023-12-16 RX ADMIN — ROCURONIUM BROMIDE 10 MG: 10 INJECTION, SOLUTION INTRAVENOUS at 09:12

## 2023-12-16 RX ADMIN — AMLODIPINE BESYLATE 10 MG: 10 TABLET ORAL at 12:12

## 2023-12-16 RX ADMIN — ACETAMINOPHEN 650 MG: 325 TABLET ORAL at 08:12

## 2023-12-16 RX ADMIN — METRONIDAZOLE 500 MG: 500 INJECTION, SOLUTION INTRAVENOUS at 12:12

## 2023-12-16 RX ADMIN — HYDROMORPHONE HYDROCHLORIDE 0.2 MG: 1 INJECTION, SOLUTION INTRAMUSCULAR; INTRAVENOUS; SUBCUTANEOUS at 11:12

## 2023-12-16 RX ADMIN — PHENYLEPHRINE HYDROCHLORIDE 200 MCG: 10 INJECTION INTRAVENOUS at 08:12

## 2023-12-16 RX ADMIN — SODIUM CHLORIDE: 0.9 INJECTION, SOLUTION INTRAVENOUS at 07:12

## 2023-12-16 RX ADMIN — Medication 10 MG: at 09:12

## 2023-12-16 RX ADMIN — PHENYLEPHRINE HYDROCHLORIDE 200 MCG: 10 INJECTION INTRAVENOUS at 07:12

## 2023-12-16 RX ADMIN — HALOPERIDOL LACTATE 0.5 MG: 5 INJECTION, SOLUTION INTRAMUSCULAR at 11:12

## 2023-12-16 RX ADMIN — ROCURONIUM BROMIDE 20 MG: 10 INJECTION, SOLUTION INTRAVENOUS at 08:12

## 2023-12-16 RX ADMIN — OXYCODONE HYDROCHLORIDE 5 MG: 5 TABLET ORAL at 12:12

## 2023-12-16 RX ADMIN — METRONIDAZOLE 500 MG: 500 INJECTION, SOLUTION INTRAVENOUS at 08:12

## 2023-12-16 RX ADMIN — VASOPRESSIN 2 UNITS: 20 INJECTION INTRAVENOUS at 08:12

## 2023-12-16 RX ADMIN — SODIUM CHLORIDE, SODIUM GLUCONATE, SODIUM ACETATE, POTASSIUM CHLORIDE, MAGNESIUM CHLORIDE, SODIUM PHOSPHATE, DIBASIC, AND POTASSIUM PHOSPHATE: .53; .5; .37; .037; .03; .012; .00082 INJECTION, SOLUTION INTRAVENOUS at 08:12

## 2023-12-16 RX ADMIN — DEXAMETHASONE SODIUM PHOSPHATE 4 MG: 4 INJECTION, SOLUTION INTRAMUSCULAR; INTRAVENOUS at 08:12

## 2023-12-16 RX ADMIN — VASOPRESSIN 3 UNITS: 20 INJECTION INTRAVENOUS at 09:12

## 2023-12-16 RX ADMIN — LIDOCAINE HYDROCHLORIDE 100 MG: 20 INJECTION INTRAVENOUS at 07:12

## 2023-12-16 RX ADMIN — SUGAMMADEX 200 MG: 100 INJECTION, SOLUTION INTRAVENOUS at 10:12

## 2023-12-16 RX ADMIN — APIXABAN 5 MG: 5 TABLET, FILM COATED ORAL at 08:12

## 2023-12-16 RX ADMIN — Medication 30 MG: at 07:12

## 2023-12-16 RX ADMIN — MIDAZOLAM HYDROCHLORIDE 2 MG: 1 INJECTION INTRAMUSCULAR; INTRAVENOUS at 07:12

## 2023-12-16 RX ADMIN — ROCURONIUM BROMIDE 50 MG: 10 INJECTION, SOLUTION INTRAVENOUS at 07:12

## 2023-12-16 RX ADMIN — FENTANYL CITRATE 50 MCG: 50 INJECTION, SOLUTION INTRAMUSCULAR; INTRAVENOUS at 09:12

## 2023-12-16 RX ADMIN — METRONIDAZOLE 500 MG: 500 INJECTION, SOLUTION INTRAVENOUS at 01:12

## 2023-12-16 RX ADMIN — FENTANYL CITRATE 50 MCG: 50 INJECTION, SOLUTION INTRAMUSCULAR; INTRAVENOUS at 10:12

## 2023-12-16 RX ADMIN — ACETAMINOPHEN 650 MG: 325 TABLET ORAL at 02:12

## 2023-12-16 RX ADMIN — FENTANYL CITRATE 50 MCG: 50 INJECTION, SOLUTION INTRAMUSCULAR; INTRAVENOUS at 07:12

## 2023-12-16 RX ADMIN — ONDANSETRON 4 MG: 2 INJECTION INTRAMUSCULAR; INTRAVENOUS at 11:12

## 2023-12-16 RX ADMIN — CEFTRIAXONE SODIUM 2 G: 1 INJECTION, POWDER, FOR SOLUTION INTRAMUSCULAR; INTRAVENOUS at 08:12

## 2023-12-16 RX ADMIN — ONDANSETRON 4 MG: 2 INJECTION INTRAMUSCULAR; INTRAVENOUS at 08:12

## 2023-12-16 RX ADMIN — HALOPERIDOL LACTATE 0.5 MG: 5 INJECTION, SOLUTION INTRAMUSCULAR at 12:12

## 2023-12-16 RX ADMIN — OXYCODONE HYDROCHLORIDE 5 MG: 5 TABLET ORAL at 06:12

## 2023-12-16 NOTE — PROGRESS NOTES
[facility] - Surgery ([facility])  [facility] Medicine  Progress Note    Patient Name: Chsae De La O  MRN: [id number]  Patient Class: IP- Inpatient   Admission Date: 12/15/2023  Length of Stay: 1 days  Attending Physician: Lizabeth Guo MD  Primary Care Provider: Janny, Primary Doctor        Subjective:     Principal Problem:Acute cholecystitis        HPI:  Mr. De La O is a 57yo M with a h/o rectal cancer s/p colectomy with ostomy, HTN, recent RLE DVT on apixaban who presented to an OSH 3 days ago with RUQ pain that woke him up from sleep. Patient was AF, HDS. Initial labs TBili 4.4, , , . CTAP showed CBD and gallbladder distension w/o obvious evidence of a stone in the biliary tree. US with GB wall thickening and pericholecystic fluid c/f acute cholecystitis. MRCP done to monitor passage of possible stone, sludge and small stones noted, no GB wall thickening. Patient started on rocephin and flagyl, pain was controlled with morphine. He was transferred to [facility] for AES evaluation and was transitioned to a heparin drip at OSH in anticipation of possible procedure. On arrival to C, patient c/o RUQ pain. He denies any pain/tenderness in the RLE, but does have some residual swelling compared to his LLE. Denies chest pain, SOB, changes in ostomy output, dysuria, hematuria.     Patient is AF, HDS on 2L NC (from transport). Labs are pending. Orders for heparin gtt and mIVF due to NPO status were placed, and home amlodipine was restarted. AES consult placed.     Overview/Hospital Course:  AES took patient for ERCP and underwent biliary sphincterotomy with biliary sludge swept out on 12/15. Gen Surg was consulted for cholecystectomy. Patient s/p lap solo on 12/16.    Past Medical History:   Diagnosis Date    Acute kidney injury     DVT (deep venous thrombosis)     Hypertension     Rectal cancer        Past Surgical History:   Procedure Laterality Date    BACK SURGERY      COLOSTOMY      NECK SURGERY       SPINE SURGERY         Review of patient's allergies indicates:   Allergen Reactions    Codeine Nausea And Vomiting       Current Facility-Administered Medications on File Prior to Encounter   Medication    [DISCONTINUED] 0.9%  NaCl infusion    [DISCONTINUED] acetaminophen tablet 650 mg    [DISCONTINUED] amLODIPine tablet 10 mg    [DISCONTINUED] cefTRIAXone (ROCEPHIN) 1 g in dextrose 5 % in water (D5W) 100 mL IVPB (MB+)    [DISCONTINUED] dextrose 10% bolus 125 mL 125 mL    [DISCONTINUED] dextrose 10% bolus 250 mL 250 mL    [DISCONTINUED] glucagon (human recombinant) injection 1 mg    [DISCONTINUED] glucagon (human recombinant) injection 1 mg    [DISCONTINUED] glucose chewable tablet 16 g    [DISCONTINUED] glucose chewable tablet 24 g    [DISCONTINUED] heparin 25,000 units in dextrose 5% (100 units/ml) IV bolus from bag - ADDITIONAL PRN BOLUS - 30 units/kg    [DISCONTINUED] heparin 25,000 units in dextrose 5% (100 units/ml) IV bolus from bag - ADDITIONAL PRN BOLUS - 60 units/kg    [DISCONTINUED] heparin 25,000 units in dextrose 5% 250 mL (100 units/mL) infusion HIGH INTENSITY nomogram - [facility]    [DISCONTINUED] insulin aspart U-100 pen 0-10 Units    [DISCONTINUED] metronidazole IVPB 500 mg    [DISCONTINUED] morphine injection 4 mg    [DISCONTINUED] naloxone 0.4 mg/mL injection 0.02 mg    [DISCONTINUED] ondansetron injection 4 mg    [DISCONTINUED] sodium chloride 0.9% flush 10 mL     Current Outpatient Medications on File Prior to Encounter   Medication Sig    amLODIPine (NORVASC) 10 MG tablet Take 10 mg by mouth once daily.    apixaban (ELIQUIS) 5 mg Tab Take 2 tablets (10 mg total) by mouth 2 (two) times daily for 7 days, THEN 1 tablet (5 mg total) 2 (two) times daily for 21 days. (Patient taking differently: Take one tablet by mouth twice a day.)    cephALEXin (KEFLEX) 500 MG capsule Take 500 mg by mouth every 6 (six) hours.    ondansetron (ZOFRAN-ODT) 4 MG TbDL Take 1 tablet (4 mg total) by mouth every 6 (six)  hours as needed.    oxyCODONE-acetaminophen (PERCOCET) 7.5-325 mg per tablet Take 1 tablet by mouth every 6 (six) hours as needed for Pain.    tamsulosin (FLOMAX) 0.4 mg Cap Take by mouth once daily.    vitamin D (VITAMIN D3) 1000 units Tab Take 50,000 Units by mouth once a week. Weekly on Mondays     Family History    None       Tobacco Use    Smoking status: Smoker, Current Status Unknown    Smokeless tobacco: Not on file   Substance and Sexual Activity    Alcohol use: Yes    Drug use: Never    Sexual activity: Not on file     Review of Systems   Constitutional:  Negative for chills and fever.   HENT:  Negative for congestion and sore throat.    Eyes:  Negative for photophobia and visual disturbance.   Respiratory:  Negative for cough and shortness of breath.    Cardiovascular:  Negative for chest pain and palpitations.   Gastrointestinal:  Positive for abdominal pain.   Genitourinary:  Positive for scrotal swelling. Negative for dysuria and hematuria.   Musculoskeletal:  Negative for arthralgias and back pain.   Skin:  Negative for rash and wound.   Neurological:  Negative for dizziness and syncope.   Psychiatric/Behavioral:  Negative for agitation and behavioral problems.      Objective:     Vital Signs (Most Recent):  Temp: 97.7 °F (36.5 °C) (12/15/23 0430)  Pulse: 87 (12/15/23 0430)  Resp: 17 (12/15/23 0430)  BP: (!) 159/90 (12/15/23 0430)  SpO2: 95 % (12/15/23 0430) Vital Signs (24h Range):  Temp:  [97.7 °F (36.5 °C)-98.3 °F (36.8 °C)] 97.7 °F (36.5 °C)  Pulse:  [] 87  Resp:  [12-25] 17  SpO2:  [94 %-99 %] 95 %  BP: (129-164)/() 159/90     Weight: 92.3 kg (203 lb 7.8 oz)  Body mass index is 30.04 kg/m².     Physical Exam  Constitutional:       General: He is not in acute distress.     Appearance: Normal appearance. He is normal weight. He is not ill-appearing.   HENT:      Head: Normocephalic and atraumatic.   Eyes:      Extraocular Movements: Extraocular movements intact.      Conjunctiva/sclera:  Conjunctivae normal.   Cardiovascular:      Rate and Rhythm: Normal rate.      Heart sounds: Normal heart sounds.   Pulmonary:      Effort: Pulmonary effort is normal. No respiratory distress.      Breath sounds: Normal breath sounds. No wheezing.   Abdominal:      General: Abdomen is flat. There is no distension.      Palpations: Abdomen is soft.      Tenderness: There is abdominal tenderness (RUQ). There is no guarding.   Musculoskeletal:         General: Swelling (RLE  > LLE) present. No tenderness.      Right lower leg: No edema.      Left lower leg: No edema.   Skin:     General: Skin is warm and dry.      Coloration: Skin is not jaundiced.      Findings: No bruising.   Neurological:      General: No focal deficit present.      Mental Status: He is alert and oriented to person, place, and time.   Psychiatric:         Mood and Affect: Mood normal.         Behavior: Behavior normal.         Thought Content: Thought content normal.         Judgment: Judgment normal.                Significant Labs: All pertinent labs within the past 24 hours have been reviewed.  CBC:   Recent Labs   Lab 12/15/23  0737 12/15/23  1953 12/16/23  0503   WBC 5.46 4.37 4.54   HGB 13.0* 12.3* 12.7*   HCT 39.4* 36.1* 37.0*    203 177     CMP:   Recent Labs   Lab 12/15/23  0737 12/16/23  0503    144   K 4.6 4.2    109   CO2 25 26   GLU 89 121*   BUN 17 16   CREATININE 1.7* 1.6*   CALCIUM 9.7 9.4   PROT 6.7 6.2   ALBUMIN 3.0* 2.9*   BILITOT 1.3* 0.9   ALKPHOS 522* 445*   AST 50* 28   * 70*   ANIONGAP 10 9       Significant Imaging: I have reviewed all pertinent imaging results/findings within the past 24 hours.    Assessment/Plan:      Scrotal swelling  Bilateral hydroceles worked up at OSH - patient to f/u with urology outpatient.       Essential hypertension  Chronic, controlled. Latest blood pressure and vitals reviewed-     Temp:  [97.7 °F (36.5 °C)-98.3 °F (36.8 °C)]   Pulse:  []   Resp:  [12-25]   BP:  (129-164)/()   SpO2:  [94 %-99 %] .   Home meds for hypertension were reviewed and noted below.   Hypertension Medications               amLODIPine (NORVASC) 10 MG tablet Take 10 mg by mouth once daily.            While in the hospital, will manage blood pressure as follows; Continue home antihypertensive regimen - amlodipine 10 daily     Will utilize p.r.n. blood pressure medication only if patient's blood pressure greater than 180/110 and he develops symptoms such as worsening chest pain or shortness of breath.    Acute deep vein thrombosis (DVT) of right lower extremity  Patient with acute DVT about 2 weeks ago, started on apixaban. Transitioned to heparin gtt at OSH in anticipation of possible procedure with AES for choledocholithiasis. Some residual swelling present in RLE, but no redness or tenderness present.     [facility]      Dilation of biliary tract  Acute cholecystitis     Patient with RUQ that woke him from sleep 3 days ago. Patient AF, HDS and initial labs significant for TBili 4.4, , , . CTAP and MRI c/f biliary sludge with possible small stones. US c/f for acute cholecystitis, but no GB wall thickening noted on MRCP. Pain was well controlled with morphine at OSH, IV abx given. Patient stable and transferred to [facility] for AES consult and possible procedure.     - AES consulted, performed ERCP on 12/15 with biliary sphincterotomy and sludge clearance.  - Gen Surg consulted, performed lap solo on 12/16. Op report noted acute cholecystitis with perforation, tracey purulence around the gallbladder. Cystic duct and cystic artery separately clipped and divided.  - continue rocephin and flagyl for 24 hours after CCY.  - pain control with PRN IV dilaudid and PO oxy 5.  - Resume regular diet      VTE Risk Mitigation (From admission, onward)           Ordered     apixaban tablet 5 mg  2 times daily         12/16/23 1402     IP VTE HIGH  RISK PATIENT  Once         12/15/23 0634     Place sequential compression device  Until discontinued         12/15/23 0634                    Discharge Planning   JOHNNY: 12/17/2023     Code Status: Full Code   Is the patient medically ready for discharge?:     Reason for patient still in hospital (select all that apply): Treatment, Consult recommendations, and Pending disposition  Discharge Plan A: Home with family                  Brad Valdovinos MD  Department of Hospital Medicine   [facility] Surgery ([facility])

## 2023-12-16 NOTE — ASSESSMENT & PLAN NOTE
Chase De La O is a 59yo male HTN, recent RLE DVT, T2DM, rectal cancer (s/p APR, chemo and radiation) who presented to the ED with severe RUQ abdominal pain.     - Plan for laparoscopic cholecystectomy today  - NPO at midnight  - Heparin held at 5am  - All other care per primary team  - Please call with any questions

## 2023-12-16 NOTE — ANESTHESIA PROCEDURE NOTES
Intubation    Date/Time: 12/16/2023 7:49 AM    Performed by: Ivan Roper CRNA  Authorized by: Stone Robles MD    Intubation:     Induction:  Intravenous    Intubated:  Postinduction    Mask Ventilation:  Easy with oral airway    Attempts:  1    Attempted By:  CRNA    Method of Intubation:  Video laryngoscopy    Blade:  Moore 3    Laryngeal View Grade: Grade I - full view of cords      Difficult Airway Encountered?: No      Complications:  None    Airway Device:  Oral endotracheal tube    Airway Device Size:  7.5    Style/Cuff Inflation:  Cuffed    Inflation Amount (mL):  7    Tube secured:  21    Secured at:  The teeth    Placement Verified By:  Capnometry    Complicating Factors:  None    Findings Post-Intubation:  BS equal bilateral

## 2023-12-16 NOTE — ANESTHESIA PREPROCEDURE EVALUATION
[facility]  Anesthesia Pre-Operative Evaluation         Patient Name: Chase De La O  YOB: 1965  MRN: [id number]    SUBJECTIVE:     Pre-operative evaluation for Procedure(s) (LRB):  CHOLECYSTECTOMY, LAPAROSCOPIC (N/A)  Scheduled for 12/16/2023    HPI 12/15/2023:  Chase De La O is a 58 y.o. male with hx of HTN, recent RLE DVT (on eliquis at home, currently on heparin drip), T2DM, rectal cancer s/p chemo and radiation here with RUQ pain. Presents for above procedure.    Prev airway:   None on file    Oxygen/Ventilation Requirements:  On room air            Patient Active Problem List   Diagnosis    Degeneration of lumbar or lumbosacral intervertebral disc    Low back pain    Dilation of biliary tract    Acute deep vein thrombosis (DVT) of right lower extremity    Essential hypertension    Scrotal swelling       Review of patient's allergies indicates:   Allergen Reactions    Codeine Nausea And Vomiting       Outpatient Medications:  No current facility-administered medications on file prior to encounter.     Current Outpatient Medications on File Prior to Encounter   Medication Sig Dispense Refill    amLODIPine (NORVASC) 10 MG tablet Take 10 mg by mouth once daily.      apixaban (ELIQUIS) 5 mg Tab Take 2 tablets (10 mg total) by mouth 2 (two) times daily for 7 days, THEN 1 tablet (5 mg total) 2 (two) times daily for 21 days. (Patient taking differently: Take one tablet by mouth twice a day.) 70 tablet 0    cephALEXin (KEFLEX) 500 MG capsule Take 500 mg by mouth every 6 (six) hours.      ondansetron (ZOFRAN-ODT) 4 MG TbDL Take 1 tablet (4 mg total) by mouth every 6 (six) hours as needed. 20 tablet 0    oxyCODONE-acetaminophen (PERCOCET) 7.5-325 mg per tablet Take 1 tablet by mouth every 6 (six) hours as needed for Pain. 20 tablet 0    tamsulosin (FLOMAX) 0.4 mg Cap Take by mouth once daily.      vitamin D (VITAMIN D3) 1000 units Tab Take 50,000 Units by mouth once a  week. Weekly on Mondays          Current Inpatient Medications:   amLODIPine  10 mg Oral Daily    cefTRIAXone (ROCEPHIN) IVPB  1 g Intravenous Q24H    metronidazole  500 mg Intravenous Q8H       Past Surgical History:   Procedure Laterality Date    BACK SURGERY      COLOSTOMY      NECK SURGERY      SPINE SURGERY         Social History     Socioeconomic History    Marital status:    Tobacco Use    Smoking status: Smoker, Current Status Unknown   Substance and Sexual Activity    Alcohol use: Yes    Drug use: Never     Social Determinants of Health     Financial Resource Strain: Low Risk  (12/15/2023)    Overall Financial Resource Strain (CARDIA)     Difficulty of Paying Living Expenses: Not hard at all   Food Insecurity: No Food Insecurity (12/15/2023)    Hunger Vital Sign     Worried About Running Out of Food in the Last Year: Never true     Ran Out of Food in the Last Year: Never true   Transportation Needs: No Transportation Needs (12/15/2023)    PRAPARE - Transportation     Lack of Transportation (Medical): No     Lack of Transportation (Non-Medical): No   Physical Activity: Inactive (12/15/2023)    Exercise Vital Sign     Days of Exercise per Week: 0 days     Minutes of Exercise per Session: 0 min   Stress: No Stress Concern Present (12/15/2023)    [employer] [place] [employer] - Occupational Stress Questionnaire     Feeling of Stress : Not at all   Social Connections: Unknown (12/15/2023)    Social Connection and Isolation Panel [NHANES]     Frequency of Communication with Friends and Family: Twice a week     Frequency of Social Gatherings with Friends and Family: Once a week     Attends Rastafari Services: 1 to 4 times per year     Active Member of Clubs or Organizations: No     Attends Club or Organization Meetings: Never     Marital Status: Patient declined   Housing Stability: Unknown (12/15/2023)    Housing Stability Vital Sign     Unable to Pay for Housing in the Last Year: No      Unstable Housing in the Last Year: No       OBJECTIVE:     Weight:  Wt Readings from Last 1 Encounters:   12/15/23 92.3 kg (203 lb 7.8 oz)     Body mass index is 30.04 kg/m².    Recent Blood Pressure Readings:  BP Readings from Last 3 Encounters:   12/15/23 (!) 152/86   12/15/23 130/77   12/07/23 (!) 142/93       Vital Signs Range (Last 24H):  Temp:  [36.4 °C (97.6 °F)-36.9 °C (98.4 °F)]   Pulse:  [71-99]   Resp:  [12-25]   BP: (129-180)/(77-99)   SpO2:  [95 %-99 %]       CBC:   Lab Results   Component Value Date    WBC 5.46 12/15/2023    HGB 13.0 (L) 12/15/2023    HCT 39.4 (L) 12/15/2023    MCV 93 12/15/2023     12/15/2023       CMP:     Chemistry        Component Value Date/Time     12/15/2023 0737    K 4.6 12/15/2023 0737     12/15/2023 0737    CO2 25 12/15/2023 0737    BUN 17 12/15/2023 0737    CREATININE 1.7 (H) 12/15/2023 0737    GLU 89 12/15/2023 0737        Component Value Date/Time    CALCIUM 9.7 12/15/2023 0737    ALKPHOS 522 (H) 12/15/2023 0737    AST 50 (H) 12/15/2023 0737     (H) 12/15/2023 0737    BILITOT 1.3 (H) 12/15/2023 0737            INR:  Lab Results   Component Value Date    INR 1.0 12/15/2023    INR 1.1 12/13/2023    INR 1.0 12/07/2023       Diagnostic Studies:      EKG:     Results for orders placed or performed during the hospital encounter of 11/22/23   EKG 12-lead    Collection Time: 11/22/23  1:49 PM    Narrative    Test Reason : R06.00,    Vent. Rate : 077 BPM     Atrial Rate : 077 BPM     P-R Int : 180 ms          QRS Dur : 084 ms      QT Int : 380 ms       P-R-T Axes : 067 060 053 degrees     QTc Int : 430 ms    Normal sinus rhythm  Normal ECG  No previous ECGs available  Confirmed by Srinath Guzman MD ([id number]) on 11/24/2023 8:35:59 AM    Referred By: [email or web address]   SELF           Confirmed By:Srinath Guzman MD       2D Echo:    No results found for this or any previous visit.    No results found for this or any previous visit.    No results found for this or any previous  visit.      ASSESSMENT/PLAN:           Pre-op Assessment    I have reviewed the Patient Summary Reports.     I have reviewed the Nursing Notes. I have reviewed the NPO Status.      Review of Systems  Anesthesia Hx:  No problems with previous Anesthesia             Denies Family Hx of Anesthesia complications.    Denies Personal Hx of Anesthesia complications.                    Social:  Smoker       Hematology/Oncology:                        --  Cancer in past history:                     Cardiovascular:     Hypertension    Denies CABG/stent.  Denies Dysrhythmias.                                    Pulmonary:    Denies COPD.  Denies Asthma.                    Hepatic/GI:   Denies PUD.   Denies GERD.             Musculoskeletal:  Arthritis               Neurological:    Denies CVA.                                    Endocrine:  Denies Diabetes.         Obesity / BMI > 30      Physical Exam  General: Well nourished, Cooperative, Alert and Oriented    Airway:  Mallampati: II   Mouth Opening: Normal  TM Distance: Normal  Tongue: Normal  Neck ROM: Normal ROM    Dental:  Intact    Chest/Lungs:  Clear to auscultation, Normal Respiratory Rate    Heart:  Rate: Normal  Rhythm: Regular Rhythm        Anesthesia Plan  Type of Anesthesia, risks & benefits discussed:    Anesthesia Type: Gen ETT  Intra-op Monitoring Plan: Standard ASA Monitors  Post Op Pain Control Plan: multimodal analgesia and IV/PO Opioids PRN  Induction:  IV  Airway Plan: Direct and Video  Informed Consent: Informed consent signed with the Patient and all parties understand the risks and agree with anesthesia plan.  All questions answered.   ASA Score: 2  Day of Surgery Review of History & Physical: H&P Update referred to the surgeon/provider.    Ready For Surgery From Anesthesia Perspective.     .

## 2023-12-16 NOTE — PROGRESS NOTES
[facility] - Telemetry Stepdown  General Surgery  Progress Note    Subjective:     History of Present Illness:  Chase De La O is a 57yo male HTN, recent RLE DVT (on eliquis at home), T2DM, rectal cancer (s/p APR, chemo and radiation) who presented to the ED with severe RUQ abdominal pain. Patient says over the past 3-5 weeks he has had intermittent RUQ abdominal pain worsened by taking deep breaths. He says this pain does not present after eating but comes on intermittently and goes away on its own. Doesn't note anything that helps alleviate the pain. MRCP showed Intra and extrahepatic biliary ductal dilatation. Underwent ERCP with GI today. General Surgery was consulted for evaluation for cholecystectomy. Tbili 1.3 this morning. Patient is not tender to palpation on exam.     Post-Op Info:  Procedure(s) (LRB):  CHOLECYSTECTOMY, LAPAROSCOPIC (N/A)   Day of Surgery     Interval History: naeon. Patient is doing well this am. Denies n/v but still having some RUQ abdominal pain. NPO since midnight. Heparin stopped at 5am.       Objective:     Vital Signs (Most Recent):  Temp: 98.7 °F (37.1 °C) (12/16/23 0657)  Pulse: 73 (12/16/23 0657)  Resp: 18 (12/16/23 0657)  BP: (!) 180/103 (12/16/23 0658)  SpO2: 98 % (12/16/23 0657) Vital Signs (24h Range):  Temp:  [97.6 °F (36.4 °C)-98.7 °F (37.1 °C)] 98.7 °F (37.1 °C)  Pulse:  [73-99] 73  Resp:  [14-24] 18  SpO2:  [95 %-99 %] 98 %  BP: (130-180)/() 180/103     Weight: 92.3 kg (203 lb 7.8 oz)  Body mass index is 30.05 kg/m².    Physical Exam  Constitutional:       Appearance: Normal appearance.   HENT:      Head: Normocephalic and atraumatic.   Eyes:      Extraocular Movements: Extraocular movements intact.      Conjunctiva/sclera: Conjunctivae normal.   Neck:      Comments: RIJ port  Cardiovascular:      Rate and Rhythm: Normal rate and regular rhythm.   Pulmonary:      Effort: Pulmonary effort is normal. No respiratory distress.   Abdominal:      General: Abdomen  is flat. There is no distension.      Palpations: Abdomen is soft. There is no mass.      Tenderness: There is no abdominal tenderness.      Comments: LLQ ostomy   Skin:     General: Skin is warm and dry.   Neurological:      General: No focal deficit present.      Mental Status: He is alert and oriented to person, place, and time.       I have reviewed all pertinent lab results within the past 24 hours.  CBC:   Recent Labs   Lab 12/16/23  0503   WBC 4.54   RBC 4.00*   HGB 12.7*   HCT 37.0*      MCV 93   MCH 31.8*   MCHC 34.3       BMP:   Recent Labs   Lab 12/16/23  0503   *      K 4.2      CO2 26   BUN 16   CREATININE 1.6*   CALCIUM 9.4   MG 1.8       Coagulation:   Recent Labs   Lab 12/15/23  1953 12/16/23  0503   LABPROT 11.2  --    INR 1.0  --    APTT 27.1 69.5*         Significant Diagnostics:  I have reviewed all pertinent imaging results/findings within the past 24 hours.    Assessment/Plan:     * Dilation of biliary tract  Chase De La O is a 59yo male HTN, recent RLE DVT, T2DM, rectal cancer (s/p APR, chemo and radiation) who presented to the ED with severe RUQ abdominal pain.     - Plan for laparoscopic cholecystectomy today  - NPO at midnight  - Heparin held at 5am  - All other care per primary team  - Please call with any questions        Bettina Davila MD  General Surgery  [facility] - Telemetry Stepdown

## 2023-12-16 NOTE — ASSESSMENT & PLAN NOTE
Patient with acute DVT about 2 weeks ago, started on apixaban. Transitioned to heparin gtt at OSH in anticipation of possible procedure with AES for choledocholithiasis. Some residual swelling present in RLE, but no redness or tenderness present.     - transition back to Eliquis evening of 12/16 post-surgery.

## 2023-12-16 NOTE — PLAN OF CARE
Problem: Adult Inpatient Plan of Care  Goal: Plan of Care Review  Outcome: Ongoing, Progressing  Goal: Patient-Specific Goal (Individualized)  Outcome: Ongoing, Progressing  Goal: Absence of Hospital-Acquired Illness or Injury  Outcome: Ongoing, Progressing  Goal: Optimal Comfort and Wellbeing  Outcome: Ongoing, Progressing  Goal: Readiness for Transition of Care  Outcome: Ongoing, Progressing     Problem: Fall Injury Risk  Goal: Absence of Fall and Fall-Related Injury  Outcome: Ongoing, Progressing     Problem: Skin Injury Risk Increased  Goal: Skin Health and Integrity  Outcome: Ongoing, Progressing     Pt Aaox4, recovering from surgery well, no acute distress at this time.

## 2023-12-16 NOTE — BRIEF OP NOTE
[facility] - Telemetry Stepdown  Brief Operative Note    SUMMARY     Surgery Date: 12/16/2023     Surgeon(s) and Role:     * Elieser Christopher MD - Primary     * Gabrielle Hartley MD - Resident - Assisting     * Bettina Davila MD - Resident - Assisting        Pre-op Diagnosis:  Choledocholithiasis [K80.50]    Post-op Diagnosis:  Post-Op Diagnosis Codes:     * Choledocholithiasis [K80.50]    Procedure(s) (LRB):  CHOLECYSTECTOMY, LAPAROSCOPIC (N/A)    Anesthesia: General    Operative Findings: Acute cholecystitis with perforation, tracey purulence around the gallbladder. Cystic duct and cystic artery separately clipped and divided    Estimated Blood Loss: 100 ml     Estimated Blood Loss has been documented.         Specimens:   Specimen (24h ago, onward)       Start     Ordered    12/16/23 0949  Specimen to Pathology, Surgery General Surgery  Once        Comments: Pre-op Diagnosis: Choledocholithiasis [K80.50]Procedure(s):CHOLECYSTECTOMY, LAPAROSCOPIC Number of specimens: 1Name of specimens: 1. Gallbladder and contents-permanent     References:    Click here for ordering Quick Tip   Question Answer Comment   Procedure Type: General Surgery    Which provider would you like to cc? ELIESER CHRISTOPHER    Release to patient Immediate        12/16/23 1008                    [id number]

## 2023-12-16 NOTE — NURSING TRANSFER
Nursing Transfer Note      12/16/2023   12:23 PM    Nurse giving handoff:STAN Patricia  Nurse receiving handoff:Awa    Reason patient is being transferred: Criteria Met    Transfer To: [facility]    Transfer via bed    Transfer with N/A    Transported by STAN Patricia & Kimberlee PCT    Transfer Vital Signs:  Blood Pressure:144/76  Heart Rate:89  O2:95%  Temperature:98.5   Respirations:15    Order for Tele Monitor? No    4eyes on Skin: yes    Medicines sent: N/A    Any special needs or follow-up needed: No    Patient belongings transferred with patient: No    Chart send with patient: Yes    Notified: Ramón (Brother)    Patient reassessed at: 1223

## 2023-12-16 NOTE — CONSULTS
[facility] - Telemetry Stepdown  General Surgery  Consult Note    Patient Name: Chase De La O  MRN: [id number]  Code Status: Full Code  Admission Date: 12/15/2023  Hospital Length of Stay: 0 days  Attending Physician: Lizabeth Guo MD  Primary Care Provider: Janny Primary Doctor    Patient information was obtained from patient and past medical records.     Inpatient consult to General Surgery  Consult performed by: Bettina Davila MD  Consult ordered by: Mariaelena Chapin MD        Subjective:     Principal Problem: Dilation of biliary tract    History of Present Illness: Chase De La O is a 59yo male HTN, recent RLE DVT (on eliquis at home), T2DM, rectal cancer (s/p APR, chemo and radiation) who presented to the ED with severe RUQ abdominal pain. Patient says over the past 3-5 weeks he has had intermittent RUQ abdominal pain worsened by taking deep breaths. He says this pain does not present after eating but comes on intermittently and goes away on its own. Doesn't note anything that helps alleviate the pain. MRCP showed Intra and extrahepatic biliary ductal dilatation. Underwent ERCP with GI today. General Surgery was consulted for evaluation for cholecystectomy. Tbili 1.3 this morning. Patient is not tender to palpation on exam.     Current Facility-Administered Medications on File Prior to Encounter   Medication    [DISCONTINUED] 0.9%  NaCl infusion    [DISCONTINUED] acetaminophen tablet 650 mg    [DISCONTINUED] amLODIPine tablet 10 mg    [DISCONTINUED] cefTRIAXone (ROCEPHIN) 1 g in dextrose 5 % in water (D5W) 100 mL IVPB (MB+)    [DISCONTINUED] dextrose 10% bolus 125 mL 125 mL    [DISCONTINUED] dextrose 10% bolus 250 mL 250 mL    [DISCONTINUED] glucagon (human recombinant) injection 1 mg    [DISCONTINUED] glucagon (human recombinant) injection 1 mg    [DISCONTINUED] glucose chewable tablet 16 g    [DISCONTINUED] glucose chewable tablet 24 g    [DISCONTINUED] heparin 25,000 units in  dextrose 5% (100 units/ml) IV bolus from bag - ADDITIONAL PRN BOLUS - 30 units/kg    [DISCONTINUED] heparin 25,000 units in dextrose 5% (100 units/ml) IV bolus from bag - ADDITIONAL PRN BOLUS - 60 units/kg    [DISCONTINUED] heparin 25,000 units in dextrose 5% 250 mL (100 units/mL) infusion HIGH INTENSITY nomogram - [facility]    [DISCONTINUED] insulin aspart U-100 pen 0-10 Units    [DISCONTINUED] metronidazole IVPB 500 mg    [DISCONTINUED] morphine injection 4 mg    [DISCONTINUED] naloxone 0.4 mg/mL injection 0.02 mg    [DISCONTINUED] ondansetron injection 4 mg    [DISCONTINUED] sodium chloride 0.9% flush 10 mL     Current Outpatient Medications on File Prior to Encounter   Medication Sig    amLODIPine (NORVASC) 10 MG tablet Take 10 mg by mouth once daily.    apixaban (ELIQUIS) 5 mg Tab Take 2 tablets (10 mg total) by mouth 2 (two) times daily for 7 days, THEN 1 tablet (5 mg total) 2 (two) times daily for 21 days. (Patient taking differently: Take one tablet by mouth twice a day.)    cephALEXin (KEFLEX) 500 MG capsule Take 500 mg by mouth every 6 (six) hours.    ondansetron (ZOFRAN-ODT) 4 MG TbDL Take 1 tablet (4 mg total) by mouth every 6 (six) hours as needed.    oxyCODONE-acetaminophen (PERCOCET) 7.5-325 mg per tablet Take 1 tablet by mouth every 6 (six) hours as needed for Pain.    tamsulosin (FLOMAX) 0.4 mg Cap Take by mouth once daily.    vitamin D (VITAMIN D3) 1000 units Tab Take 50,000 Units by mouth once a week. Weekly on Mondays       Review of patient's allergies indicates:   Allergen Reactions    Codeine Nausea And Vomiting       Past Medical History:   Diagnosis Date    Acute kidney injury     DVT (deep venous thrombosis)     Hypertension     Rectal cancer      Past Surgical History:   Procedure Laterality Date    BACK SURGERY      COLOSTOMY      NECK SURGERY      SPINE SURGERY       Family History    None       Tobacco Use    Smoking status: Smoker, Current Status Unknown    Smokeless tobacco: Not on file    Substance and Sexual Activity    Alcohol use: Yes    Drug use: Never    Sexual activity: Not on file     Review of Systems   Constitutional:  Negative for chills and fever.   Eyes:  Negative for pain and redness.   Respiratory:  Negative for chest tightness, shortness of breath and wheezing.    Cardiovascular:  Negative for chest pain and palpitations.   Gastrointestinal:  Positive for abdominal pain.   Skin:  Negative for color change and pallor.   Neurological:  Negative for light-headedness and headaches.   Psychiatric/Behavioral:  Negative for agitation and confusion.    All other systems reviewed and are negative.    Objective:     Vital Signs (Most Recent):  Temp: 97.6 °F (36.4 °C) (12/15/23 1500)  Pulse: 96 (12/15/23 1515)  Resp: (!) 24 (12/15/23 1515)  BP: (!) 152/86 (12/15/23 1545)  SpO2: 99 % (12/15/23 1515) Vital Signs (24h Range):  Temp:  [97.6 °F (36.4 °C)-98.4 °F (36.9 °C)] 97.6 °F (36.4 °C)  Pulse:  [71-99] 96  Resp:  [12-25] 24  SpO2:  [95 %-99 %] 99 %  BP: (129-180)/(77-99) 152/86     Weight: 92.3 kg (203 lb 7.8 oz)  Body mass index is 30.04 kg/m².     Physical Exam  Constitutional:       Appearance: Normal appearance.   HENT:      Head: Normocephalic and atraumatic.   Eyes:      Extraocular Movements: Extraocular movements intact.      Conjunctiva/sclera: Conjunctivae normal.   Neck:      Comments: RIJ port  Cardiovascular:      Rate and Rhythm: Normal rate and regular rhythm.   Pulmonary:      Effort: Pulmonary effort is normal. No respiratory distress.   Abdominal:      General: Abdomen is flat. There is no distension.      Palpations: Abdomen is soft. There is no mass.      Tenderness: There is no abdominal tenderness.      Comments: LLQ ostomy   Skin:     General: Skin is warm and dry.   Neurological:      General: No focal deficit present.      Mental Status: He is alert and oriented to person, place, and time.       I have reviewed all pertinent lab results within the past 24 hours.  CBC:    Recent Labs   Lab 12/15/23  0737   WBC 5.46   RBC 4.24*   HGB 13.0*   HCT 39.4*      MCV 93   MCH 30.7   MCHC 33.0     BMP:   Recent Labs   Lab 12/15/23  0737   GLU 89      K 4.6      CO2 25   BUN 17   CREATININE 1.7*   CALCIUM 9.7   MG 1.8     Coagulation:   Recent Labs   Lab 12/15/23  0737   LABPROT 11.0   INR 1.0   APTT 29.1       Significant Diagnostics:  I have reviewed all pertinent imaging results/findings within the past 24 hours.    Assessment/Plan:     * Dilation of biliary tract  Chase De La O is a 57yo male HTN, recent RLE DVT, T2DM, rectal cancer (s/p APR, chemo and radiation) who presented to the ED with severe RUQ abdominal pain.     - Plan for laparoscopic cholecystectomy tomorrow  - NPO at midnight  - Hold heparin 2h before OR  - All other care per primary team  - Please call with any questions      Bettina Davila MD  General Surgery  [facility] - Telemetry Stepdown

## 2023-12-16 NOTE — TREATMENT PLAN
[facility] Follow-up Note     Chase De La O was seen and examined.   Day of Surgery s/p ERCP   No abdominal discomfort. Tolerating diet.    Vitals stable. Afebrile.     Lab Results   Component Value Date    ALT 70 (H) 12/16/2023    AST 28 12/16/2023    ALKPHOS 445 (H) 12/16/2023    BILITOT 0.9 12/16/2023    BILITOT 1.3 (H) 12/15/2023    BILITOT 1.4 (H) 12/14/2023       No sx/sx of post-ERCP pancreatitis or other procedural complications. Discussed with brother at bedside as patient already gone for cholecystectomy this am.   AES will sign off. Please call if any questions/concerns.  Patient will be contacted with follow-up information.     Lucas Lagos DO  Gastroenterology & Hepatology Fellow

## 2023-12-16 NOTE — HOSPITAL COURSE
AES took patient for ERCP and underwent biliary sphincterotomy with biliary sludge swept out on 12/15. Gen Surg was consulted for cholecystectomy. Patient s/p lap solo on 12/16. Tolerated procedure well. Has been able to eat/drink and pass gas. Hgb stable. Instructed to complete 5 day course of Augmenting post lap solo. Continue Eliquis for DVT treatment. Discussed medications prior to discharge. Stable to discharge home today.     Vitals:    12/17/23 1100 12/17/23 1145 12/17/23 1233 12/17/23 1300   BP:  128/62     BP Location:       Patient Position:  Lying     Pulse:  91     Resp:  20 18    Temp:  99.8 °F (37.7 °C)     TempSrc:  Oral     SpO2: 96% (!) 93%  97%   Weight:       Height:         Physical Exam  Constitutional:       General: He is not in acute distress.     Appearance: Normal appearance. He is normal weight. He is not ill-appearing.   HENT:      Head: Normocephalic and atraumatic.   Eyes:      Extraocular Movements: Extraocular movements intact.      Conjunctiva/sclera: Conjunctivae normal.   Cardiovascular:      Rate and Rhythm: Normal rate.      Heart sounds: Normal heart sounds.   Pulmonary:      Effort: Pulmonary effort is normal. No respiratory distress.      Breath sounds: Normal breath sounds. No wheezing.   Abdominal:      General: Abdomen is flat. There is no distension.      Palpations: Abdomen is soft.      Tenderness: There is abdominal tenderness (RUQ)- improving. There is no guarding.   Musculoskeletal:         General: Swelling (RLE  > LLE) present. No tenderness.      Right lower leg: No edema.      Left lower leg: No edema.   Skin:     General: Skin is warm and dry.      Coloration: Skin is not jaundiced.      Findings: No bruising.   Neurological:      General: No focal deficit present.      Mental Status: He is alert and oriented to person, place, and time.   Psychiatric:         Mood and Affect: Mood normal.         Behavior: Behavior normal.         Thought Content: Thought  content normal.         Judgment: Judgment normal.

## 2023-12-16 NOTE — SUBJECTIVE & OBJECTIVE
Interval History: naeon. Patient is doing well this am. Denies n/v but still having some RUQ abdominal pain. NPO since midnight. Heparin stopped at 5am.       Objective:     Vital Signs (Most Recent):  Temp: 98.7 °F (37.1 °C) (12/16/23 0657)  Pulse: 73 (12/16/23 0657)  Resp: 18 (12/16/23 0657)  BP: (!) 180/103 (12/16/23 0658)  SpO2: 98 % (12/16/23 0657) Vital Signs (24h Range):  Temp:  [97.6 °F (36.4 °C)-98.7 °F (37.1 °C)] 98.7 °F (37.1 °C)  Pulse:  [73-99] 73  Resp:  [14-24] 18  SpO2:  [95 %-99 %] 98 %  BP: (130-180)/() 180/103     Weight: 92.3 kg (203 lb 7.8 oz)  Body mass index is 30.05 kg/m².     Physical Exam  Constitutional:       Appearance: Normal appearance.   HENT:      Head: Normocephalic and atraumatic.   Eyes:      Extraocular Movements: Extraocular movements intact.      Conjunctiva/sclera: Conjunctivae normal.   Neck:      Comments: RIJ port  Cardiovascular:      Rate and Rhythm: Normal rate and regular rhythm.   Pulmonary:      Effort: Pulmonary effort is normal. No respiratory distress.   Abdominal:      General: Abdomen is flat. There is no distension.      Palpations: Abdomen is soft. There is no mass.      Tenderness: There is no abdominal tenderness.      Comments: LLQ ostomy   Skin:     General: Skin is warm and dry.   Neurological:      General: No focal deficit present.      Mental Status: He is alert and oriented to person, place, and time.       I have reviewed all pertinent lab results within the past 24 hours.  CBC:   Recent Labs   Lab 12/16/23  0503   WBC 4.54   RBC 4.00*   HGB 12.7*   HCT 37.0*      MCV 93   MCH 31.8*   MCHC 34.3       BMP:   Recent Labs   Lab 12/16/23  0503   *      K 4.2      CO2 26   BUN 16   CREATININE 1.6*   CALCIUM 9.4   MG 1.8       Coagulation:   Recent Labs   Lab 12/15/23  1953 12/16/23  0503   LABPROT 11.2  --    INR 1.0  --    APTT 27.1 69.5*         Significant Diagnostics:  I have reviewed all pertinent imaging  results/findings within the past 24 hours.

## 2023-12-16 NOTE — PLAN OF CARE
Problem: Adult Inpatient Plan of Care  Goal: Plan of Care Review  Outcome: Ongoing, Progressing  Goal: Patient-Specific Goal (Individualized)  Outcome: Ongoing, Progressing  Goal: Absence of Hospital-Acquired Illness or Injury  Outcome: Ongoing, Progressing  Goal: Optimal Comfort and Wellbeing  Outcome: Ongoing, Progressing  Goal: Readiness for Transition of Care  Outcome: Ongoing, Progressing     VSS. On RA. On Heparin gtt throughout shift, dc'd @ 0515. Safety precautions maintained. Call light in hand. Care is ongoing.

## 2023-12-16 NOTE — NURSING
Pt back frm PACU, no complaints of nausea, pain at a 4 on a 0-10 pain scale, will give prn pain med.

## 2023-12-16 NOTE — TRANSFER OF CARE
"Anesthesia Transfer of Care Note    Patient: Chase De La O    Procedure(s) Performed: Procedure(s) (LRB):  CHOLECYSTECTOMY, LAPAROSCOPIC (N/A)    Patient location: PACU    Transport from OR: Transported from OR on room air with adequate spontaneous ventilation    Post pain: adequate analgesia    Post assessment: no apparent anesthetic complications and tolerated procedure well    Post vital signs: stable    Level of consciousness: responds to stimulation    Nausea/Vomiting: no vomiting    Complications: none    Transfer of care protocol was followed      Last vitals: Visit Vitals  BP (!) 196/95 (BP Location: Right arm, Patient Position: Lying)   Pulse 95   Temp 37.1 °C (98.8 °F) (Temporal)   Resp 20   Ht 5' 9" (1.753 m)   Wt 92.3 kg (203 lb 7.8 oz)   SpO2 96%   BMI 30.05 kg/m²     "

## 2023-12-17 VITALS
RESPIRATION RATE: 20 BRPM | OXYGEN SATURATION: 93 % | DIASTOLIC BLOOD PRESSURE: 77 MMHG | TEMPERATURE: 99 F | SYSTOLIC BLOOD PRESSURE: 130 MMHG | BODY MASS INDEX: 30.14 KG/M2 | HEIGHT: 69 IN | WEIGHT: 203.5 LBS | HEART RATE: 105 BPM

## 2023-12-17 LAB
ALBUMIN SERPL BCP-MCNC: 2.9 G/DL (ref 3.5–5.2)
ALP SERPL-CCNC: 425 U/L (ref 55–135)
ALT SERPL W/O P-5'-P-CCNC: 68 U/L (ref 10–44)
ANION GAP SERPL CALC-SCNC: 10 MMOL/L (ref 8–16)
AST SERPL-CCNC: 47 U/L (ref 10–40)
BASOPHILS # BLD AUTO: 0.01 K/UL (ref 0–0.2)
BASOPHILS NFR BLD: 0.2 % (ref 0–1.9)
BILIRUB SERPL-MCNC: 0.7 MG/DL (ref 0.1–1)
BUN SERPL-MCNC: 19 MG/DL (ref 6–20)
CALCIUM SERPL-MCNC: 9 MG/DL (ref 8.7–10.5)
CHLORIDE SERPL-SCNC: 110 MMOL/L (ref 95–110)
CO2 SERPL-SCNC: 21 MMOL/L (ref 23–29)
CREAT SERPL-MCNC: 1.7 MG/DL (ref 0.5–1.4)
DIFFERENTIAL METHOD BLD: ABNORMAL
EOSINOPHIL # BLD AUTO: 0 K/UL (ref 0–0.5)
EOSINOPHIL NFR BLD: 0.5 % (ref 0–8)
ERYTHROCYTE [DISTWIDTH] IN BLOOD BY AUTOMATED COUNT: 13.8 % (ref 11.5–14.5)
EST. GFR  (NO RACE VARIABLE): 46.2 ML/MIN/1.73 M^2
GLUCOSE SERPL-MCNC: 141 MG/DL (ref 70–110)
HCT VFR BLD AUTO: 31.3 % (ref 40–54)
HGB BLD-MCNC: 10.9 G/DL (ref 14–18)
IMM GRANULOCYTES # BLD AUTO: 0.03 K/UL (ref 0–0.04)
IMM GRANULOCYTES NFR BLD AUTO: 0.5 % (ref 0–0.5)
LYMPHOCYTES # BLD AUTO: 0.5 K/UL (ref 1–4.8)
LYMPHOCYTES NFR BLD: 9.3 % (ref 18–48)
MAGNESIUM SERPL-MCNC: 1.8 MG/DL (ref 1.6–2.6)
MCH RBC QN AUTO: 30.4 PG (ref 27–31)
MCHC RBC AUTO-ENTMCNC: 34.8 G/DL (ref 32–36)
MCV RBC AUTO: 87 FL (ref 82–98)
MONOCYTES # BLD AUTO: 0.5 K/UL (ref 0.3–1)
MONOCYTES NFR BLD: 9.7 % (ref 4–15)
NEUTROPHILS # BLD AUTO: 4.4 K/UL (ref 1.8–7.7)
NEUTROPHILS NFR BLD: 79.8 % (ref 38–73)
NRBC BLD-RTO: 0 /100 WBC
PHOSPHATE SERPL-MCNC: 3.1 MG/DL (ref 2.7–4.5)
PLATELET # BLD AUTO: 161 K/UL (ref 150–450)
PMV BLD AUTO: 10.8 FL (ref 9.2–12.9)
POTASSIUM SERPL-SCNC: 4.1 MMOL/L (ref 3.5–5.1)
PROT SERPL-MCNC: 6.2 G/DL (ref 6–8.4)
RBC # BLD AUTO: 3.58 M/UL (ref 4.6–6.2)
SODIUM SERPL-SCNC: 141 MMOL/L (ref 136–145)
WBC # BLD AUTO: 5.46 K/UL (ref 3.9–12.7)

## 2023-12-17 PROCEDURE — 83735 ASSAY OF MAGNESIUM: CPT

## 2023-12-17 PROCEDURE — 25000003 PHARM REV CODE 250

## 2023-12-17 PROCEDURE — 84100 ASSAY OF PHOSPHORUS: CPT

## 2023-12-17 PROCEDURE — 80053 COMPREHEN METABOLIC PANEL: CPT

## 2023-12-17 PROCEDURE — 85025 COMPLETE CBC W/AUTO DIFF WBC: CPT

## 2023-12-17 PROCEDURE — 36415 COLL VENOUS BLD VENIPUNCTURE: CPT

## 2023-12-17 PROCEDURE — 25000003 PHARM REV CODE 250: Performed by: STUDENT IN AN ORGANIZED HEALTH CARE EDUCATION/TRAINING PROGRAM

## 2023-12-17 PROCEDURE — 63600175 PHARM REV CODE 636 W HCPCS

## 2023-12-17 PROCEDURE — 94761 N-INVAS EAR/PLS OXIMETRY MLT: CPT

## 2023-12-17 RX ORDER — AMOXICILLIN AND CLAVULANATE POTASSIUM 875; 125 MG/1; MG/1
1 TABLET, FILM COATED ORAL EVERY 12 HOURS
Qty: 10 TABLET | Refills: 0 | Status: SHIPPED | OUTPATIENT
Start: 2023-12-17 | End: 2023-12-22

## 2023-12-17 RX ORDER — AMOXICILLIN AND CLAVULANATE POTASSIUM 875; 125 MG/1; MG/1
1 TABLET, FILM COATED ORAL EVERY 12 HOURS
Status: DISCONTINUED | OUTPATIENT
Start: 2023-12-17 | End: 2023-12-17 | Stop reason: HOSPADM

## 2023-12-17 RX ADMIN — METRONIDAZOLE 500 MG: 500 INJECTION, SOLUTION INTRAVENOUS at 04:12

## 2023-12-17 RX ADMIN — OXYCODONE HYDROCHLORIDE 5 MG: 5 TABLET ORAL at 12:12

## 2023-12-17 RX ADMIN — AMOXICILLIN AND CLAVULANATE POTASSIUM 1 TABLET: 875; 125 TABLET, FILM COATED ORAL at 10:12

## 2023-12-17 RX ADMIN — ACETAMINOPHEN 650 MG: 325 TABLET ORAL at 10:12

## 2023-12-17 RX ADMIN — APIXABAN 5 MG: 5 TABLET, FILM COATED ORAL at 10:12

## 2023-12-17 RX ADMIN — AMLODIPINE BESYLATE 10 MG: 10 TABLET ORAL at 10:12

## 2023-12-17 NOTE — ASSESSMENT & PLAN NOTE
Chase De La O is a 57yo male HTN, recent RLE DVT, T2DM, rectal cancer (s/p APR, chemo and radiation) who presented to the ED with severe RUQ abdominal pain.     - Now s/p lap solo on 12/16  - will follow-up am CBC  - if hgb stable ok to d/c from surgical standpoint with 5 days of augmentin  - All other care per primary team  - Please call with any questions

## 2023-12-17 NOTE — PROGRESS NOTES
[facility] - Telemetry Stepdown  General Surgery  Progress Note    Subjective:     History of Present Illness:  Chase De La O is a 57yo male HTN, recent RLE DVT (on eliquis at home), T2DM, rectal cancer (s/p APR, chemo and radiation) who presented to the ED with severe RUQ abdominal pain. Patient says over the past 3-5 weeks he has had intermittent RUQ abdominal pain worsened by taking deep breaths. He says this pain does not present after eating but comes on intermittently and goes away on its own. Doesn't note anything that helps alleviate the pain. MRCP showed Intra and extrahepatic biliary ductal dilatation. Underwent ERCP with GI today. General Surgery was consulted for evaluation for cholecystectomy. Tbili 1.3 this morning. Patient is not tender to palpation on exam.     Post-Op Info:  Procedure(s) (LRB):  CHOLECYSTECTOMY, LAPAROSCOPIC (N/A)   1 Day Post-Op     Interval History: naeon. Doing well this am. Says his pain is significantly improved since surgery.       Objective:     Vital Signs (Most Recent):  Temp: 99.4 °F (37.4 °C) (12/17/23 0804)  Pulse: 96 (12/17/23 0804)  Resp: 18 (12/17/23 0804)  BP: (!) 167/94 (12/17/23 0804)  SpO2: (!) 94 % (12/17/23 0804) Vital Signs (24h Range):  Temp:  [68.2 °F (20.1 °C)-99.4 °F (37.4 °C)] 99.4 °F (37.4 °C)  Pulse:  [] 96  Resp:  [13-27] 18  SpO2:  [94 %-97 %] 94 %  BP: (140-196)/() 167/94     Weight: 92.3 kg (203 lb 7.8 oz)  Body mass index is 30.05 kg/m².    Physical Exam  Constitutional:       Appearance: Normal appearance.   HENT:      Head: Normocephalic and atraumatic.   Eyes:      Extraocular Movements: Extraocular movements intact.      Conjunctiva/sclera: Conjunctivae normal.   Neck:      Comments: RIJ port  Cardiovascular:      Rate and Rhythm: Normal rate and regular rhythm.   Pulmonary:      Effort: Pulmonary effort is normal. No respiratory distress.   Abdominal:      General: Abdomen is flat. There is no distension.      Palpations:  Abdomen is soft. There is no mass.      Tenderness: There is no abdominal tenderness.      Comments: LLQ ostomy  Abdominal incisions c/d/I and covered in dermabond   Skin:     General: Skin is warm and dry.   Neurological:      General: No focal deficit present.      Mental Status: He is alert and oriented to person, place, and time.       I have reviewed all pertinent lab results within the past 24 hours.  CBC:   Recent Labs   Lab 12/16/23  0503   WBC 4.54   RBC 4.00*   HGB 12.7*   HCT 37.0*      MCV 93   MCH 31.8*   MCHC 34.3       BMP:   Recent Labs   Lab 12/17/23  0455   *      K 4.1      CO2 21*   BUN 19   CREATININE 1.7*   CALCIUM 9.0   MG 1.8       Coagulation:   Recent Labs   Lab 12/15/23  1953 12/16/23  0503   LABPROT 11.2  --    INR 1.0  --    APTT 27.1 69.5*         Significant Diagnostics:  I have reviewed all pertinent imaging results/findings within the past 24 hours.    Assessment/Plan:     Dilation of biliary tract  Chase De La O is a 57yo male HTN, recent RLE DVT, T2DM, rectal cancer (s/p APR, chemo and radiation) who presented to the ED with severe RUQ abdominal pain.     - Now s/p lap solo on 12/16  - will follow-up am CBC  - if hgb stable ok to d/c from surgical standpoint with 5 days of augmentin  - All other care per primary team  - Please call with any questions        Bettina Davila MD  General Surgery  [facility] - Tele[facility] Stepdown

## 2023-12-17 NOTE — NURSING
Pt discharged, AVS given, pt verbalizes understanding.. No acute distress at this time. Pt will be going home raudel POV via his brother.

## 2023-12-17 NOTE — DISCHARGE SUMMARY
[facility] - Telemetry [facility] Medicine  Discharge Summary      Patient Name: Chase De La O  MRN: [id number]  KANU: [id number]  Patient Class: IP- Inpatient  Admission Date: 12/15/2023  Hospital Length of Stay: 2 days  Discharge Date and Time:  12/17/2023 1:53 PM  Attending Physician: Lizabeth Guo MD   Discharging Provider: Mariaelena Chapin MD  Primary Care Provider: Janny Primary Doctor  [facility] Medicine Team: [facility] HOSP MED 2 Mariaelena Chapin MD  Primary Care Team: [facility] 2    HPI:   Mr. De La O is a 59yo M with a h/o rectal cancer s/p colectomy with ostomy, HTN, recent RLE DVT on apixaban who presented to an OSH 3 days ago with RUQ pain that woke him up from sleep. Patient was AF, HDS. Initial labs TBili 4.4, , , . CTAP showed CBD and gallbladder distension w/o obvious evidence of a stone in the biliary tree. US with GB wall thickening and pericholecystic fluid c/f acute cholecystitis. MRCP done to monitor passage of possible stone, sludge and small stones noted, no GB wall thickening. Patient started on rocephin and flagyl, pain was controlled with morphine. He was transferred to [facility] for AES evaluation and was transitioned to a heparin drip at OSH in anticipation of possible procedure. On arrival to [facility], patient c/o RUQ pain. He denies any pain/tenderness in the RLE, but does have some residual swelling compared to his LLE. Denies chest pain, SOB, changes in ostomy output, dysuria, hematuria.     Patient is AF, HDS on 2L NC (from transport). Labs are pending. Orders for heparin gtt and mIVF due to NPO status were placed, and home amlodipine was restarted. AES consult placed.     Procedure(s) (LRB):  CHOLECYSTECTOMY, LAPAROSCOPIC (N/A)      Hospital Course:   AES took patient for ERCP and underwent biliary sphincterotomy with biliary sludge swept out on 12/15. Gen Surg was consulted for cholecystectomy. Patient s/p lap solo on 12/16. Tolerated  procedure well. Has been able to eat/drink and pass gas. Hgb stable. Instructed to complete 5 day course of Augmenting post lap solo. Continue Eliquis for DVT treatment. Discussed medications prior to discharge. Stable to discharge home today.     Vitals:    12/17/23 1100 12/17/23 1145 12/17/23 1233 12/17/23 1300   BP:  128/62     BP Location:       Patient Position:  Lying     Pulse:  91     Resp:  20 18    Temp:  99.8 °F (37.7 °C)     TempSrc:  Oral     SpO2: 96% (!) 93%  97%   Weight:       Height:         Physical Exam  Constitutional:       General: He is not in acute distress.     Appearance: Normal appearance. He is normal weight. He is not ill-appearing.   HENT:      Head: Normocephalic and atraumatic.   Eyes:      Extraocular Movements: Extraocular movements intact.      Conjunctiva/sclera: Conjunctivae normal.   Cardiovascular:      Rate and Rhythm: Normal rate.      Heart sounds: Normal heart sounds.   Pulmonary:      Effort: Pulmonary effort is normal. No respiratory distress.      Breath sounds: Normal breath sounds. No wheezing.   Abdominal:      General: Abdomen is flat. There is no distension.      Palpations: Abdomen is soft.      Tenderness: There is abdominal tenderness (RUQ)- improving. There is no guarding.   Musculoskeletal:         General: Swelling (RLE  > LLE) present. No tenderness.      Right lower leg: No edema.      Left lower leg: No edema.   Skin:     General: Skin is warm and dry.      Coloration: Skin is not jaundiced.      Findings: No bruising.   Neurological:      General: No focal deficit present.      Mental Status: He is alert and oriented to person, place, and time.   Psychiatric:         Mood and Affect: Mood normal.         Behavior: Behavior normal.         Thought Content: Thought content normal.         Judgment: Judgment normal.      Goals of Care Treatment Preferences:  Code Status: Full Code      Consults:   Consults (From admission, onward)          Status Ordering  Provider     Inpatient consult to General Surgery  Once        Provider:  (Not yet assigned)    Completed VARGAS ARANA     Inpatient consult to [facility] ([facility])  Once        Provider:  (Not yet assigned)    Completed BELEM GARNICA new Assessment & Plan notes have been filed under this hospital service since the last note was generated.  Service: Hospital Medicine    Final Active Diagnoses:    Diagnosis Date Noted POA    PRINCIPAL PROBLEM:  Acute cholecystitis [K81.0] 12/16/2023 Yes    Acute deep vein thrombosis (DVT) of right lower extremity [I82.401] 12/13/2023 Yes    Essential hypertension [I10] 12/13/2023 Yes    Scrotal swelling [N50.89] 12/13/2023 Yes    Dilation of biliary tract [K83.8] 12/13/2023 Yes      Problems Resolved During this Admission:    Diagnosis Date Noted Date Resolved POA    Choledocholithiasis [K80.50] 12/15/2023 12/15/2023 Yes       Discharged Condition: stable    Disposition:     Follow Up:    Patient Instructions:   No discharge procedures on file.    Significant Diagnostic Studies: N/A    Pending Diagnostic Studies:       Procedure Component Value Units Date/Time    Specimen to Pathology, Surgery General Surgery [[id number]] Collected: 12/16/23 1039    Order Status: Sent Lab Status: In process Updated: 12/16/23 1039    Specimen: Tissue            Medications:  Reconciled Home Medications:      Medication List        START taking these medications      amoxicillin-clavulanate 875-125mg 875-125 mg per tablet  Commonly known as: AUGMENTIN  Take 1 tablet by mouth every 12 (twelve) hours. for 5 days            CHANGE how you take these medications      apixaban 5 mg Tab  Commonly known as: ELIQUIS  Take 1 tablet (5 mg total) by mouth 2 (two) times daily.  What changed: See the new instructions.            CONTINUE taking these medications      amLODIPine 10 MG tablet  Commonly known as: NORVASC  Take 10 mg by mouth once daily.     ondansetron 4 MG  Tbdl  Commonly known as: ZOFRAN-ODT  Take 1 tablet (4 mg total) by mouth every 6 (six) hours as needed.     oxyCODONE-acetaminophen 7.5-325 mg per tablet  Commonly known as: PERCOCET  Take 1 tablet by mouth every 6 (six) hours as needed for Pain.     tamsulosin 0.4 mg Cap  Commonly known as: FLOMAX  Take by mouth once daily.     vitamin D 1000 units Tab  Commonly known as: VITAMIN D3  Take 50,000 Units by mouth once a week. Weekly on Mondays            STOP taking these medications      cephALEXin 500 MG capsule  Commonly known as: KEFLEX              Indwelling Lines/Drains at time of discharge:   Lines/Drains/Airways       None                   Time spent on the discharge of patient: 45 minutes         Mariaelena Chapin MD  Department of Hospital Medicine  [facility] - Telemetry Stepdown   No edema.

## 2023-12-17 NOTE — PLAN OF CARE
Problem: Adult Inpatient Plan of Care  Goal: Plan of Care Review  Outcome: Ongoing, Progressing  Goal: Absence of Hospital-Acquired Illness or Injury  Outcome: Ongoing, Progressing  Goal: Optimal Comfort and Wellbeing  Outcome: Ongoing, Progressing     Problem: Fall Injury Risk  Goal: Absence of Fall and Fall-Related Injury  Outcome: Ongoing, Progressing     Problem: Skin Injury Risk Increased  Goal: Skin Health and Integrity  Outcome: Ongoing, Progressing     Patient AAOx4 able to make needs known. Denies pain at this time. Bed in lowest position and locked, brother at bedside. Call bell and personal items within reach.

## 2023-12-17 NOTE — DISCHARGE INSTRUCTIONS
Complete 5 day course of Augmentin as prescribed. F/u CBC lab in 1 week to monitor Hgb. Post lap solo, a hgb drop is expected; however, we would like to further monitor.

## 2023-12-17 NOTE — NURSING
PATIENT PROVIDED WITH IS .  INSTRUCTIONS PROVIDED ON HOW TO USE AND THE PURPOSE OF USAGE  PATIENT RETURNED DEMONSTRATION   AND IT WAS USED PROPERLY     7 REPS   2 @1000  1 @ 1500  4 @ 2000    PATIENT  INSTRUCTED TO USE EVERY HOUR WHILE AWAKE  GOAL 2500

## 2023-12-17 NOTE — PLAN OF CARE
Problem: Adult Inpatient Plan of Care  Goal: Plan of Care Review  Outcome: Ongoing, Progressing  Goal: Patient-Specific Goal (Individualized)  Outcome: Ongoing, Progressing  Goal: Absence of Hospital-Acquired Illness or Injury  Outcome: Ongoing, Progressing  Goal: Optimal Comfort and Wellbeing  Outcome: Ongoing, Progressing  Goal: Readiness for Transition of Care  Outcome: Ongoing, Progressing     Problem: Fall Injury Risk  Goal: Absence of Fall and Fall-Related Injury  Outcome: Ongoing, Progressing     Problem: Skin Injury Risk Increased  Goal: Skin Health and Integrity  Outcome: Ongoing, Progressing        Pt AAOx4, Brother remains at bedside with pt. Pt has c/o moderate pain to RUQ & Epigastric area, prn pain meds have been administered. Pt has also been offered cold packs in between available medication time to help relieve pain. Currently monitoring lab work, pending discharge.

## 2023-12-17 NOTE — SUBJECTIVE & OBJECTIVE
Interval History: ambreen. Doing well this am. Says his pain is significantly improved since surgery.       Objective:     Vital Signs (Most Recent):  Temp: 99.4 °F (37.4 °C) (12/17/23 0804)  Pulse: 96 (12/17/23 0804)  Resp: 18 (12/17/23 0804)  BP: (!) 167/94 (12/17/23 0804)  SpO2: (!) 94 % (12/17/23 0804) Vital Signs (24h Range):  Temp:  [68.2 °F (20.1 °C)-99.4 °F (37.4 °C)] 99.4 °F (37.4 °C)  Pulse:  [] 96  Resp:  [13-27] 18  SpO2:  [94 %-97 %] 94 %  BP: (140-196)/() 167/94     Weight: 92.3 kg (203 lb 7.8 oz)  Body mass index is 30.05 kg/m².     Physical Exam  Constitutional:       Appearance: Normal appearance.   HENT:      Head: Normocephalic and atraumatic.   Eyes:      Extraocular Movements: Extraocular movements intact.      Conjunctiva/sclera: Conjunctivae normal.   Neck:      Comments: RIJ port  Cardiovascular:      Rate and Rhythm: Normal rate and regular rhythm.   Pulmonary:      Effort: Pulmonary effort is normal. No respiratory distress.   Abdominal:      General: Abdomen is flat. There is no distension.      Palpations: Abdomen is soft. There is no mass.      Tenderness: There is no abdominal tenderness.      Comments: LLQ ostomy  Abdominal incisions c/d/I and covered in dermabond   Skin:     General: Skin is warm and dry.   Neurological:      General: No focal deficit present.      Mental Status: He is alert and oriented to person, place, and time.       I have reviewed all pertinent lab results within the past 24 hours.  CBC:   Recent Labs   Lab 12/16/23  0503   WBC 4.54   RBC 4.00*   HGB 12.7*   HCT 37.0*      MCV 93   MCH 31.8*   MCHC 34.3       BMP:   Recent Labs   Lab 12/17/23  0455   *      K 4.1      CO2 21*   BUN 19   CREATININE 1.7*   CALCIUM 9.0   MG 1.8       Coagulation:   Recent Labs   Lab 12/15/23  1953 12/16/23  0503   LABPROT 11.2  --    INR 1.0  --    APTT 27.1 69.5*         Significant Diagnostics:  I have reviewed all pertinent imaging  results/findings within the past 24 hours.

## 2023-12-17 NOTE — NURSING
20gauge LFA IV site inadvertently  removed by patient. Area cleaned , 4x4 gauze and tape applied     Patient bed linen changed     Patient ambulated in room and to bathroom patient tolerated well.     Bed in lowest position and locked   Call bell and personal items within reach    Brother at bedside.

## 2023-12-18 ENCOUNTER — TELEPHONE (OUTPATIENT)
Dept: SURGERY | Facility: CLINIC | Age: 58
End: 2023-12-18
Payer: OTHER GOVERNMENT

## 2023-12-18 NOTE — OP NOTE
[facility] - Telemetry Stepdown  General Surgery  Operative Note    SUMMARY     Date of Procedure: 12/17/2023     Procedure: Laparoscopic cholecystectomy     Provider: Elieser Christopher MD     Assisting Provider:  Gabrielle Hartley MD (RES), Bettina Davila MD (RES)    Indications: This patient presents with symptomatic gallbladder disease and will undergo laparoscopic cholecystectomy.    Pre-Operative Diagnosis: Calculus of gallbladder with acute cholecystitis, without mention of obstruction    Post-Operative Diagnosis: Acute cholecystitis    Anesthesia: General      Description of the Findings of the Procedure:    The patient was brought to the OR and positioned in supine position. General anesthesia was administered. The patient was prepped and draped in the standard sterile fashion. A time out was called and all were in agreement.     Local anesthetic was administered just inferior to the umbilicus and an incision was made using a #11 blade. Using blunt and sharp dissection, the anterior fascia was identified and opened. An 11 mm trocar was placed into the abdomen via Rene technique. Pneumoperitoneum was created. Three 5 mm trocars were placed in a similar fashion under direct visualization, 1 in the subxiphoid region and 2 in the patient's right upper quadrant.      The patient was placed into reverse trendelenburg and the gallbladder was grasped with a locking grasper. There was noted to be tracey purulence from around the gallbladder with severe pericholecystic inflammatory changes. A [place] dissector and hook electrocautery was used to dissect out the cystic artery and cystic duct. The gallbladder was then freed of all surrounding adhesions and the dissected 1/3 of the way off the liver to successfully achieve the critical view. Two plastic clips was placed proximally on the cystic duct and one was placed distally. The cystic duct was transected with laparoscopic scissors. Similarly one clip was placed  proximally on the cystic artery and a single clip distally. Both structures were then transected using scissors. A posterior branch of the cystic artery was noted which was dissected, clipped and transection. The gallbladder was then dissected off of the fossa using hook electrocautery. A specimen bag was placed into the abdomen and the gallbladder was removed intact.      The gallbladder fossa was then irrigated and hemostasis was achieved. Melba was placed into the gallbladder fossa for additional satisfactory hemostatic control. The 5 mm trocars were removed under direct visualization and then the 11 mm trocar. Pneumoperitoneum was relieved and the fascial defect was closed with 0-vicryl suture in an interrupted fashion. 4-0 monocryl was used to close all skin incisions. Dermabond was applied. All sponge, instrument, and need counts were correct at the end of the case.       Complications: None; patient tolerated the procedure well.    Total IV Fluids: 1000 mL    Estimated Blood Loss (EBL): less than 100 mL     Drains: None           Implants: None    Specimens: Gallbladder           Condition: stable    Disposition: PACU - hemodynamically stable.    Attestation:     Dr. Christopher was scrubbed and present for the entire case.

## 2023-12-19 ENCOUNTER — TELEPHONE (OUTPATIENT)
Dept: SURGERY | Facility: CLINIC | Age: 58
End: 2023-12-19
Payer: OTHER GOVERNMENT

## 2023-12-21 NOTE — ANESTHESIA POSTPROCEDURE EVALUATION
Anesthesia Post Evaluation    Patient: Chase De La O    Procedure(s) Performed: Procedure(s) (LRB):  CHOLECYSTECTOMY, LAPAROSCOPIC (N/A)    Final Anesthesia Type: general      Patient location during evaluation: PACU  Patient participation: Yes- Able to Participate  Level of consciousness: awake and alert  Post-procedure vital signs: reviewed and stable  Pain management: adequate  Airway patency: patent    PONV status at discharge: No PONV  Anesthetic complications: no      Cardiovascular status: blood pressure returned to baseline  Respiratory status: unassisted  Hydration status: euvolemic  Follow-up not needed.              Vitals Value Taken Time   /77 12/17/23 1547   Temp 37.3 °C (99.1 °F) 12/17/23 1547   Pulse 105 12/17/23 1547   Resp 20 12/17/23 1547   SpO2 93 % 12/17/23 1547         Event Time   Out of Recovery 12/16/2023 11:15:00         Pain/Adis Score: No data recorded

## 2023-12-25 NOTE — DISCHARGE SUMMARY
[facility] Medicine  Discharge Summary      Patient Name: Chase De La O  MRN: [id number]  KANU: [id number]  Patient Class: IP- Inpatient  Admission Date: 12/12/2023  Hospital Length of Stay: 2 days  Discharge Date and Time: 12/15/2023  4:15 AM  Attending Physician: Janny att. providers found   Discharging Provider: Everett Villegas MD  Primary Care Provider: Janny, Primary Doctor    Primary Care Team: Networked reference to record PCT     HPI:   The patient is a 57 y/o male with PMH of HTN, colon ca s/p colectomy and DVT who presented with RUQ abdominal pain which started on the am of 12/12 and woke him from his sleep. Patient is accompanied by his friend who also provides some of the history. He had associated nausea and vomiting. [place]. Work up in the ER showed on CT, CBD and gallbladder distention to 5 mm but no stone visible. Pain controlled with morphine. No fevers reported. Patient was recently diagnosed with RLE DVT and is on apixaban. Patient has been accepted for transfer to [facility] in [place] but is awaiting a bed.     * No surgery found *      Hospital Course:   Patient admitted for choledocholithiasis causing acute pancreatitis. Patient admitted as transfer hold. Started on IV abx, IVF, IV pain medication. Transferred for higher level of care.     Goals of Care Treatment Preferences:  Code Status: Full Code      Consults:     No new Assessment & Plan notes have been filed under this hospital service since the last note was generated.  Service: Hospital Medicine    Final Active Diagnoses:    Diagnosis Date Noted POA    PRINCIPAL PROBLEM:  Dilation of biliary tract [K83.8] 12/13/2023 Yes    Acute deep vein thrombosis (DVT) of right lower extremity [I82.401] 12/13/2023 Yes    Essential hypertension [I10] 12/13/2023 Yes    Scrotal swelling [N50.89] 12/13/2023 Yes      Problems Resolved During  this Admission:       Discharged Condition: stable    Disposition: Planned Readmission - An*    Follow Up:    Patient Instructions:   No discharge procedures on file.    Significant Diagnostic Studies: N/A    Pending Diagnostic Studies:       None           Medications:  None    Indwelling Lines/Drains at time of discharge:   Lines/Drains/Airways       None                   Time spent on the discharge of patient: 15 minutes         Everett Villegas MD  Department of Hospital Medicine  [place] - Intensive Care

## 2023-12-25 NOTE — HOSPITAL COURSE
Patient admitted for choledocholithiasis causing acute pancreatitis. Patient admitted as transfer hold. Started on IV abx, IVF, IV pain medication. Transferred for higher level of care.

## 2023-12-28 NOTE — ANESTHESIA POSTPROCEDURE EVALUATION
Anesthesia Post Evaluation    Patient: Chase De La O    Procedure(s) Performed: Procedure(s) (LRB):  ERCP (ENDOSCOPIC RETROGRADE CHOLANGIOPANCREATOGRAPHY) (N/A)    Final Anesthesia Type: general      Patient location during evaluation: PACU  Patient participation: Yes- Able to Participate  Level of consciousness: awake and alert  Post-procedure vital signs: reviewed and stable  Pain management: adequate  Airway patency: patent    PONV status at discharge: No PONV  Anesthetic complications: no      Cardiovascular status: blood pressure returned to baseline  Respiratory status: unassisted, room air and spontaneous ventilation  Hydration status: euvolemic  Follow-up not needed.            See PACU vitals      Event Time   Out of Recovery 16:23:00         Pain/Adis Score: No data recorded

## 2024-01-03 LAB
FINAL PATHOLOGIC DIAGNOSIS: ABNORMAL
GROSS: ABNORMAL
Lab: ABNORMAL
MICROSCOPIC EXAM: ABNORMAL

## 2024-01-04 NOTE — PHYSICIAN QUERY
PT Name: Chase De La O  MR #: [id number]    DOCUMENTATION CLARIFICATION   Latanya Marsh RN, CCDS    [email or web address]    Documentation Excellence  This form is a permanent document in the medical record.     Query Date: January 4, 2024    By submitting this query, we are merely seeking further clarification of documentation.    Please utilize your independent clinical judgment when addressing the question(s) below.    The medical record contains the following:  Pathology Findings Location in Medical Record   Final Pathologic Diagnosis    GALLBLADDER, CHOLECYSTECTOMY:  - Invasive adenocarcinoma (intestinal-type), poorly differentiated, with signet ring cell     features   - Adenocarcinoma is PRESENT at the inked cystic duct margin   - Lymph-vascular and perineural invasion are PRESENT  - Strips of attached hepatic parenchyma without obvious involvement by tumor  - Background gallbladder mucosa with cholesterolosis and extensive ischemic necrosis  - See comment    Comment:  Histologic sections reveal multiple infiltrative foci of tumor cells within the gallbladder wall and focally in the overlying mucosal lamina propria. A small portion of hepatic parenchyma is attached and appears uninvolved by the overlying tumor cells in the examined sections. The tumor cells are present singly, as small clusters, and as nests with background mucin pools. The tumor cells have a signet ring cell appearance, with abundant mucin filled cytoplasm and peripheral focally flattened nuclei. The tumor cells  are positive for CK20 and CDX-2 by immunohistochemistry (IHC). Additional studies, including CK7, synaptophysin, and chromogranin, are either negative or negative in the cells of interest.    IHC studies for DNA mismatch repair demonstrate INTACT STAINING of all 4 proteins (MLH1, PMS2,  MSH2, and MSH6) within lesional cells. These results indicate that this tumor is likely microsatellite stable.    Preliminary results were  discussed with Dr. Elieser Christopher (general surgery) via telephone on 12/21/23   Path collected 12/16      Please clarify the pathology findings.    [  ] Pathology findings noted above are ruled in/confirmed as diagnoses     [  ] Pathology findings noted above are not confirmed as diagnoses     [ x ] Pathology findings noted above are incidental     [ ] Other diagnosis (please specify): _________     [  ]     Clinically Undetermined     Please document in your progress notes daily for the duration of treatment until resolved and include in your discharge summary.    Form No. [id number]

## 2024-01-05 ENCOUNTER — TELEPHONE (OUTPATIENT)
Dept: SURGERY | Facility: CLINIC | Age: 59
End: 2024-01-05
Payer: OTHER GOVERNMENT

## 2024-01-05 DIAGNOSIS — C23 GALLBLADDER CANCER: Primary | ICD-10-CM

## 2024-01-05 NOTE — PHYSICIAN QUERY
PT Name: Chase De La O  MR #: [id number]     DOCUMENTATION CLARIFICATION    Latanya Marsh RN, CCDS    [email or web address]    Documentation Excellence  This form is a permanent document in the medical record.     Query Date: January 5, 2024    Dear Provider,  By submitting this query, we are merely seeking further clarification of documentation.    Please utilize your independent clinical judgment when addressing the question(s) below.     The Medical Record contains the following:    Supporting Clinical Findings Location in Medical Record   Pathology findings noted above are incidental  MD Query response  1/4/2024  9:10 PM  Elieser Christopher MD   Final Pathologic Diagnosis     GALLBLADDER, CHOLECYSTECTOMY:  - Invasive adenocarcinoma (intestinal-type), poorly differentiated, with signet ring cell features   - Adenocarcinoma is PRESENT at the inked cystic duct margin   - Lymph-vascular and perineural invasion are PRESENT  - Strips of attached hepatic parenchyma without obvious involvement by tumor  - Background gallbladder mucosa with cholesterolosis and extensive ischemic necrosis  - See comment     Comment:  Histologic sections reveal multiple infiltrative foci of tumor cells within the gallbladder wall and focally in the overlying mucosal lamina propria. A small portion of hepatic parenchyma is attached and appears uninvolved by the overlying tumor cells in the examined sections. The tumor cells are present singly, as small clusters, and as nests with background mucin pools. The tumor cells have a signet ring cell appearance, with abundant mucin filled cytoplasm and peripheral focally flattened nuclei. The tumor cells  are positive for CK20 and CDX-2 by immunohistochemistry (IHC). Additional studies, including CK7, synaptophysin, and chromogranin, are either negative or negative in the cells of interest.     IHC studies for DNA mismatch repair demonstrate INTACT STAINING of all 4 proteins (MLH1,  "PMS2,  MSH2, and MSH6) within lesional cells. These results indicate that this tumor is likely microsatellite stable.     Preliminary results were discussed with Dr. Elieser Christopher (general surgery) via telephone on 12/21/23   Path collected 12/16      Dr Christopher,  Please clarify the pathological finding of -     "GALLBLADDER, CHOLECYSTECTOMY:  - Invasive adenocarcinoma (intestinal-type), poorly differentiated, with signet ring cell features   - Adenocarcinoma is PRESENT at the inked cystic duct margin   - Lymph-vascular and perineural invasion are PRESENT  - Strips of attached hepatic parenchyma without obvious involvement by tumor  - Background gallbladder mucosa with cholesterolosis and extensive ischemic necrosis"     [ x ] Ruled In     [  ] Ruled Out     [  ] Other - specify:  ____________         [   ]   Clinically undetermined      Please document in your progress notes daily for the duration of treatment, until resolved, and include in your discharge summary.    Form No. [id number]                                                                            "

## 2024-01-05 NOTE — TELEPHONE ENCOUNTER
Called the patient for follow up and to discuss his pathology results. Overall the patient feels well, tolerating diet, denies pain, and the incisions are healing well. We discussed the final pathology which unfortunately shows a poorly differentiated invasive adenocarcinoma with a positive cystic duct margin. We discussed that this will likely require further surgery and the need for a metastatic workup. I told the patient that I have referred him to one of my surgical oncology colleagues, Dr. Jethro Root, and that someone from his office would contact him. He voiced understanding and is looking forward to being contacted.    Elieser Christopher MD, GAURANG, FACS  Acute Care Surgery & Surgical Critical Care  [facility]

## 2024-01-08 ENCOUNTER — TELEPHONE (OUTPATIENT)
Dept: HEMATOLOGY/ONCOLOGY | Facility: CLINIC | Age: 59
End: 2024-01-08
Payer: OTHER GOVERNMENT

## 2024-01-08 NOTE — NURSING
Spoke with patient and scheduled appointment for 01/16/2024 with Christohper Root and Iraj; Provided patient with appointment time and date, address of [facility] facility and direct line to navigator. All questions and concerns addressed.

## 2024-01-09 ENCOUNTER — PATIENT MESSAGE (OUTPATIENT)
Dept: HEMATOLOGY/ONCOLOGY | Facility: CLINIC | Age: 59
End: 2024-01-09
Payer: OTHER GOVERNMENT

## 2024-01-16 ENCOUNTER — HOSPITAL ENCOUNTER (EMERGENCY)
Facility: HOSPITAL | Age: 59
Discharge: HOME | End: 2024-01-16
Attending: EMERGENCY MEDICINE
Payer: OTHER GOVERNMENT

## 2024-01-16 VITALS
RESPIRATION RATE: 20 BRPM | WEIGHT: 190 LBS | HEART RATE: 121 BPM | TEMPERATURE: 98 F | BODY MASS INDEX: 28.14 KG/M2 | OXYGEN SATURATION: 97 % | SYSTOLIC BLOOD PRESSURE: 135 MMHG | HEIGHT: 69 IN | DIASTOLIC BLOOD PRESSURE: 96 MMHG

## 2024-01-16 DIAGNOSIS — R31.9 URINARY TRACT INFECTION WITH HEMATURIA, SITE UNSPECIFIED: Primary | ICD-10-CM

## 2024-01-16 DIAGNOSIS — M89.9 LYTIC BONE LESIONS ON XRAY: ICD-10-CM

## 2024-01-16 DIAGNOSIS — N39.0 URINARY TRACT INFECTION WITH HEMATURIA, SITE UNSPECIFIED: Primary | ICD-10-CM

## 2024-01-16 DIAGNOSIS — H49.21 SIXTH NERVE PALSY OF RIGHT EYE: ICD-10-CM

## 2024-01-16 LAB
ALBUMIN SERPL BCP-MCNC: 3.4 G/DL (ref 3.5–5.2)
ALP SERPL-CCNC: 253 U/L (ref 55–135)
ALT SERPL W/O P-5'-P-CCNC: 9 U/L (ref 10–44)
ANION GAP SERPL CALC-SCNC: 14 MMOL/L (ref 8–16)
AST SERPL-CCNC: 30 U/L (ref 10–40)
BACTERIA #/AREA URNS HPF: ABNORMAL /HPF
BASOPHILS # BLD AUTO: 0.04 K/UL (ref 0–0.2)
BASOPHILS NFR BLD: 0.7 % (ref 0–1.9)
BILIRUB SERPL-MCNC: 0.4 MG/DL (ref 0.1–1)
BILIRUB UR QL STRIP: ABNORMAL
BUN SERPL-MCNC: 44 MG/DL (ref 6–20)
CALCIUM SERPL-MCNC: 10.8 MG/DL (ref 8.7–10.5)
CHLORIDE SERPL-SCNC: 100 MMOL/L (ref 95–110)
CLARITY UR: ABNORMAL
CO2 SERPL-SCNC: 24 MMOL/L (ref 23–29)
COLOR UR: ABNORMAL
CREAT SERPL-MCNC: 3.8 MG/DL (ref 0.5–1.4)
DIFFERENTIAL METHOD BLD: ABNORMAL
EOSINOPHIL # BLD AUTO: 0.2 K/UL (ref 0–0.5)
EOSINOPHIL NFR BLD: 3 % (ref 0–8)
ERYTHROCYTE [DISTWIDTH] IN BLOOD BY AUTOMATED COUNT: 14 % (ref 11.5–14.5)
EST. GFR  (NO RACE VARIABLE): 17.6 ML/MIN/1.73 M^2
GLUCOSE SERPL-MCNC: 209 MG/DL (ref 70–110)
GLUCOSE UR QL STRIP: NEGATIVE
HCT VFR BLD AUTO: 33.5 % (ref 40–54)
HGB BLD-MCNC: 11.2 G/DL (ref 14–18)
HGB UR QL STRIP: ABNORMAL
HYALINE CASTS #/AREA URNS LPF: 0 /LPF
IMM GRANULOCYTES # BLD AUTO: 0.11 K/UL (ref 0–0.04)
IMM GRANULOCYTES NFR BLD AUTO: 1.8 % (ref 0–0.5)
KETONES UR QL STRIP: ABNORMAL
LEUKOCYTE ESTERASE UR QL STRIP: ABNORMAL
LYMPHOCYTES # BLD AUTO: 0.4 K/UL (ref 1–4.8)
LYMPHOCYTES NFR BLD: 6.1 % (ref 18–48)
MCH RBC QN AUTO: 30.8 PG (ref 27–31)
MCHC RBC AUTO-ENTMCNC: 33.4 G/DL (ref 32–36)
MCV RBC AUTO: 92 FL (ref 82–98)
MICROSCOPIC COMMENT: ABNORMAL
MONOCYTES # BLD AUTO: 0.4 K/UL (ref 0.3–1)
MONOCYTES NFR BLD: 7 % (ref 4–15)
NEUTROPHILS # BLD AUTO: 4.9 K/UL (ref 1.8–7.7)
NEUTROPHILS NFR BLD: 81.4 % (ref 38–73)
NITRITE UR QL STRIP: NEGATIVE
NRBC BLD-RTO: 0 /100 WBC
PH UR STRIP: 5 [PH] (ref 5–8)
PLATELET # BLD AUTO: 140 K/UL (ref 150–450)
PMV BLD AUTO: 11.6 FL (ref 9.2–12.9)
POCT GLUCOSE: 195 MG/DL (ref 70–110)
POTASSIUM SERPL-SCNC: 4.1 MMOL/L (ref 3.5–5.1)
PROT SERPL-MCNC: 6.7 G/DL (ref 6–8.4)
PROT UR QL STRIP: ABNORMAL
RBC # BLD AUTO: 3.64 M/UL (ref 4.6–6.2)
RBC #/AREA URNS HPF: >100 /HPF (ref 0–4)
SODIUM SERPL-SCNC: 138 MMOL/L (ref 136–145)
SP GR UR STRIP: 1.01 (ref 1–1.03)
URN SPEC COLLECT METH UR: ABNORMAL
UROBILINOGEN UR STRIP-ACNC: 1 EU/DL
WBC # BLD AUTO: 6.04 K/UL (ref 3.9–12.7)
WBC #/AREA URNS HPF: 30 /HPF (ref 0–5)

## 2024-01-16 PROCEDURE — 80053 COMPREHEN METABOLIC PANEL: CPT | Performed by: EMERGENCY MEDICINE

## 2024-01-16 PROCEDURE — 85025 COMPLETE CBC W/AUTO DIFF WBC: CPT | Performed by: EMERGENCY MEDICINE

## 2024-01-16 PROCEDURE — 81000 URINALYSIS NONAUTO W/SCOPE: CPT | Performed by: EMERGENCY MEDICINE

## 2024-01-16 PROCEDURE — 25000003 PHARM REV CODE 250: Performed by: EMERGENCY MEDICINE

## 2024-01-16 PROCEDURE — 70450 CT HEAD/BRAIN W/O DYE: CPT | Mod: TC

## 2024-01-16 PROCEDURE — 82962 GLUCOSE BLOOD TEST: CPT

## 2024-01-16 PROCEDURE — 99285 EMERGENCY DEPT VISIT HI MDM: CPT | Mod: 25

## 2024-01-16 PROCEDURE — 87086 URINE CULTURE/COLONY COUNT: CPT | Performed by: EMERGENCY MEDICINE

## 2024-01-16 PROCEDURE — 96374 THER/PROPH/DIAG INJ IV PUSH: CPT

## 2024-01-16 PROCEDURE — 63600175 PHARM REV CODE 636 W HCPCS: Performed by: EMERGENCY MEDICINE

## 2024-01-16 PROCEDURE — 70450 CT HEAD/BRAIN W/O DYE: CPT | Mod: 26,,, | Performed by: RADIOLOGY

## 2024-01-16 RX ORDER — POLYETHYLENE GLYCOL 3350 17 G/17G
17 POWDER, FOR SOLUTION ORAL
COMMUNITY

## 2024-01-16 RX ORDER — HYDRALAZINE HYDROCHLORIDE 10 MG/1
20 TABLET, FILM COATED ORAL
COMMUNITY
Start: 2023-12-29

## 2024-01-16 RX ORDER — CEPHALEXIN 250 MG/1
250 CAPSULE ORAL 4 TIMES DAILY
Qty: 28 CAPSULE | Refills: 0 | Status: SHIPPED | OUTPATIENT
Start: 2024-01-16 | End: 2024-01-23

## 2024-01-16 RX ORDER — PREGABALIN 75 MG/1
75 CAPSULE ORAL
COMMUNITY

## 2024-01-16 RX ORDER — FUROSEMIDE 40 MG/1
40 TABLET ORAL
COMMUNITY
Start: 2023-12-21 | End: 2024-12-20

## 2024-01-16 RX ORDER — SIMETHICONE 180 MG
1 CAPSULE ORAL 4 TIMES DAILY PRN
COMMUNITY

## 2024-01-16 RX ADMIN — CEFTRIAXONE SODIUM 1 G: 1 INJECTION, POWDER, FOR SOLUTION INTRAMUSCULAR; INTRAVENOUS at 06:01

## 2024-01-16 NOTE — ED NOTES
Urine sample collected, dark red urine noted with some clots. MD notified. Pt reports has hx of rectal cancer and colostomy bag is in place.

## 2024-01-16 NOTE — ED PROVIDER NOTES
"Encounter Date: 1/16/2024       History     Chief Complaint   Patient presents with    Dysuria     Dysuria, frequency and blood in urine x 1 week    Eye Problem     "Double vision" x 1 week     58-year-old male, here from home via private vehicle for evaluation and treatment of hematuria, and diplopia.  Both symptoms have been present for almost 2 weeks.  Patient also endorses bladder pain and bilateral lower back pain.  Denies fevers or chills.  Patient currently taking Eliquis secondary to a DVT in the right leg.  Patient also states that he suffers from double vision.  He states he has normal vision in each eye when 1 eye is covered.  Patient also has numerous chronic medical issues and history.  He has suffering from rectal cancer in the past for which he underwent radiation treatment.  He has decreased kidney function which is thought to be the result of the radiation therapy.  He recently had a cholecystectomy, and biopsy of the gallbladder raise suspicion for gallbladder cancer.  He states he is scheduled to undergo a PET scan and will also see a specialist in [place] within the next few weeks.  Patient denies any headaches, but does endorse some perioral numbness and tingling, only on the left side.  The numbness was noticed at about the same time that he noticed the double vision.  Denies any numbness, tingling, or weakness of the extremities.  He has a normal gait.  Patient denies any dark or bloody stools.  No bleeding gums.      Review of patient's allergies indicates:   Allergen Reactions    Codeine Nausea And Vomiting     Past Medical History:   Diagnosis Date    Acute kidney injury     DVT (deep venous thrombosis)     Hypertension     Rectal cancer      Past Surgical History:   Procedure Laterality Date    BACK SURGERY      COLOSTOMY      ERCP N/A 12/15/2023    Procedure: ERCP (ENDOSCOPIC RETROGRADE CHOLANGIOPANCREATOGRAPHY);  Surgeon: Stu White MD;  Location: [facility] ([address] [place] [address]);  Service: " Endoscopy;  Laterality: N/A;    LAPAROSCOPIC CHOLECYSTECTOMY N/A 12/16/2023    Procedure: CHOLECYSTECTOMY, LAPAROSCOPIC;  Surgeon: Elieser Christopher MD;  Location: [facility] OR [address] [place] [address];  Service: General;  Laterality: N/A;    NECK SURGERY      SPINE SURGERY       No family history on file.  Social History     Tobacco Use    Smoking status: Smoker, Current Status Unknown   Substance Use Topics    Alcohol use: Yes    Drug use: Never     Review of Systems   Constitutional: Negative.    HENT: Negative.     Eyes:  Positive for visual disturbance.   Respiratory: Negative.     Cardiovascular: Negative.    Gastrointestinal: Negative.    Endocrine: Negative.    Genitourinary:  Positive for hematuria.   Musculoskeletal: Negative.    Skin: Negative.    Allergic/Immunologic: Negative.    Neurological:  Positive for numbness (Left perioral).   Psychiatric/Behavioral: Negative.         Physical Exam     Initial Vitals [01/16/24 1557]   BP Pulse Resp Temp SpO2   (!) 135/96 (!) 121 20 98.2 °F (36.8 °C) 97 %      MAP       --         Physical Exam    Nursing note and vitals reviewed.  Constitutional: He appears well-developed and well-nourished. No distress.   HENT:   Head: Normocephalic and atraumatic.   Right Ear: External ear normal.   Left Ear: External ear normal.   Nose: Nose normal.   Eyes: Conjunctivae are normal. Pupils are equal, round, and reactive to light. Right eye exhibits no discharge. Left eye exhibits no discharge. No scleral icterus.   When looking at me as I stand on his right side, the patient's left eye does focus on my face, but the right eye does not move significantly past midline to the right side.   Neck: Neck supple. No thyromegaly present. No JVD present.   Normal range of motion.  Cardiovascular:  Regular rhythm, normal heart sounds and intact distal pulses.           No murmur heard.  Tachycardic at 120 beats per minute   Pulmonary/Chest: Breath sounds normal. No stridor. No respiratory distress. He has no  wheezes. He has no rhonchi. He has no rales.   Abdominal: Abdomen is soft. Bowel sounds are normal. He exhibits no distension. There is no abdominal tenderness.   Musculoskeletal:         General: No tenderness or edema. Normal range of motion.      Cervical back: Normal range of motion and neck supple.     Neurological: He is alert and oriented to person, place, and time. He has normal strength. No cranial nerve deficit or sensory deficit. GCS score is 15. GCS eye subscore is 4. GCS verbal subscore is 5. GCS motor subscore is 6.   Extraocular movements are abnormal.  Patient appears to have a 6 nerve palsy on the right, and has difficulty moving the right eye laterally past midline   Skin: Skin is warm and dry. Capillary refill takes less than 2 seconds. No rash noted. No erythema.   Psychiatric: He has a normal mood and affect. His behavior is normal.         ED Course   Procedures  Labs Reviewed   URINALYSIS, REFLEX TO URINE CULTURE - Abnormal; Notable for the following components:       Result Value    Color, UA Red (*)     Appearance, UA Cloudy (*)     Protein, UA 3+ (*)     Ketones, UA 1+ (*)     Bilirubin (UA) 3+ (*)     Occult Blood UA 3+ (*)     Leukocytes, UA 3+ (*)     All other components within normal limits    Narrative:     Preferred Collection Type->Urine, Clean Catch  Specimen Source->Urine   CBC W/ AUTO DIFFERENTIAL - Abnormal; Notable for the following components:    RBC 3.64 (*)     Hemoglobin 11.2 (*)     Hematocrit 33.5 (*)     Platelets 140 (*)     Immature Granulocytes 1.8 (*)     Immature Grans (Abs) 0.11 (*)     Lymph # 0.4 (*)     Gran % 81.4 (*)     Lymph % 6.1 (*)     All other components within normal limits   COMPREHENSIVE METABOLIC PANEL - Abnormal; Notable for the following components:    Glucose 209 (*)     BUN 44 (*)     Creatinine 3.8 (*)     Calcium 10.8 (*)     Albumin 3.4 (*)     Alkaline Phosphatase 253 (*)     ALT 9 (*)     eGFR 17.6 (*)     All other components within normal  limits   URINALYSIS MICROSCOPIC - Abnormal; Notable for the following components:    RBC, UA >100 (*)     WBC, UA 30 (*)     Bacteria Many (*)     All other components within normal limits    Narrative:     Preferred Collection Type->Urine, Clean Catch  Specimen Source->Urine   POCT GLUCOSE - Abnormal; Notable for the following components:    POCT Glucose 195 (*)     All other components within normal limits   CULTURE, URINE          Imaging Results              CT Head Without Contrast (Final result)  Result time 01/16/24 17:14:17      Final result by Tarik Lloyd MD (01/16/24 17:14:17)                   Impression:      1. No acute intracranial CT findings identified.  2. Numerous lytic lesions involving the a visualized osseous structures most notably the calvarium which are nonspecific.  Correlate for any history of malignancy as metastatic disease would be a potential differential among other malignant or benign possibilities.      Electronically signed by: Tarik Lloyd  Date:    01/16/2024  Time:    17:14               Narrative:    EXAMINATION:  CT HEAD WITHOUT CONTRAST    CLINICAL HISTORY:  Diplopia;    TECHNIQUE:  Low dose axial CT images obtained throughout the head without intravenous contrast. Sagittal and coronal reconstructions were performed.    COMPARISON:  None.    FINDINGS:  Brain: There is no evidence of a mass, edema, midline shift, or intracranial hemorrhage. No extra-axial fluid collection.  Findings of suspected mild age-related chronic small vessel ischemic changes within the cerebral hemispheric white matter although nonspecific.  No CT evidence of an acute major vascular territorial infarct.    Ventricles: The ventricles, sulci, and cisterns are within normal limits.    Skull: Numerous nonspecific abnormal bony lucencies throughout the visualized osseous structures most notably the calvarium.  These measure up to 1.4 cm involving the right parietal calvarium.    Extracranial soft tissues:  Limited imaging is within normal limits.    Other: Scattered paranasal sinus opacification is relatively mild.  Partially imaged presumed small retention cyst within the right maxillary sinus.  Visualized orbits and mastoid air cells are within normal limits.                                    X-Rays:   Independently Interpreted Readings:   Other Readings:  CT brain, personally reviewed by me, shows no evidence of intracranial hemorrhage, no obvious intracranial mass, no mass effect.  There are, however, numerous lytic lesions of the skull.    Medications   cefTRIAXone (Rocephin) 1 g in dextrose 5 % in water (D5W) 100 mL IVPB (MB+) (has no administration in time range)     Medical Decision Making  Differential includes hematuria secondary to infection, hematuria secondary to Eliquis use, neoplasm, kidney injury, 6th nerve palsy,  brain mass, other neoplasm, etc.    Urinalysis shows numerous bacteria, numerous white cells, numerous blood cells, indicating likely urinary tract infection.  Patient was treated here with Rocephin and will take Keflex at home.  Patient appears to have a 6 nerve palsy involving the right eye, and he has some perioral numbness and tingling on the left side of his mouth.  CT of the brain was performed and personally reviewed by me.  This shows no evidence of intracranial mass or mass effect, but there are numerous lytic lesions of the skull.  Patient is unsure if he had these lesions with his previous colorectal cancer.  He has not appointment for a PET scan on Monday, 6 days from now.  Patient is urged to contact his oncologist tomorrow to discuss today's findings, and keep his appointment for PET scan.  If his urinary symptoms continue despite antibiotics, he will follow-up with urology.  He will return here for any worsening signs or symptoms.    Amount and/or Complexity of Data Reviewed  Labs: ordered.  Radiology: ordered.    Risk  Prescription drug management.                                       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 Provider    cephALEXin (KEFLEX) 250 MG capsule Take 1 capsule (250 mg total) by mouth 4 (four) times daily. for 7 days 28 capsule 1/16/2024 1/23/2024 Calvin Tuttle MD          Follow-up Information       Follow up With Specialties Details Why Contact Info    Your oncologist  Call in 1 day      [facility] Emergency Dept Emergency Medicine  As needed, If symptoms worsen [address] [facility] [address] [phone]             Calvin Tuttle MD  01/16/24 1826       Calvin Tuttle MD  01/16/24 1823

## 2024-01-17 NOTE — DISCHARGE INSTRUCTIONS
As we discussed, continue with your previously prescribed medications and treatments, take Keflex for urinary tract infection, and call your oncologist tomorrow to tell him about your CT, which shows some lesions of the skull bones.  Keep your appointment for PET scan on Monday.  Return here for any worsening signs or symptoms.

## 2024-01-18 LAB — BACTERIA UR CULT: NO GROWTH

## 2024-01-31 ENCOUNTER — TELEPHONE (OUTPATIENT)
Dept: HEMATOLOGY/ONCOLOGY | Facility: CLINIC | Age: 59
End: 2024-01-31
Payer: OTHER GOVERNMENT

## 2024-02-06 ENCOUNTER — TELEPHONE (OUTPATIENT)
Dept: SURGERY | Facility: CLINIC | Age: 59
End: 2024-02-06
Payer: OTHER GOVERNMENT

## 2024-02-06 NOTE — TELEPHONE ENCOUNTER
Informed Crystal at [facility] that I have left message with  and have been unable to re-schedule appt.

## (undated) DEVICE — TROCAR ENDOPATH XCEL 5X75MM

## (undated) DEVICE — SUT 0 VICRYL / UR6 (J603)

## (undated) DEVICE — SOL NS 1000CC

## (undated) DEVICE — APPLICATOR ARISTA FLEX XL

## (undated) DEVICE — ELECTRODE REM PLYHSV RETURN 9

## (undated) DEVICE — ADHESIVE DERMABOND ADVANCED

## (undated) DEVICE — TRAY MINOR GEN SURG OMC

## (undated) DEVICE — IRRIGATOR ENDOSCOPY DISP.

## (undated) DEVICE — BLADE SURG CARBON STEEL SZ11

## (undated) DEVICE — TROCAR SPACEMAKER BLUNT 10MM

## (undated) DEVICE — DRAPE CORETEMP FLD WRM 56X62IN

## (undated) DEVICE — POWDER ARISTA AH 3G

## (undated) DEVICE — SCISSOR 5MMX35CM DIRECT DRIVE

## (undated) DEVICE — TROCAR ENDOPATH XCEL 5MM 7.5CM

## (undated) DEVICE — SUT MCRYL PLUS 4-0 PS2 27IN

## (undated) DEVICE — TOWEL OR DISP STRL BLUE 4/PK

## (undated) DEVICE — GLOVE GAMMEX SURG LF PI SZ 7.5

## (undated) DEVICE — KIT ANTIFOG W/SPONG & FLUID

## (undated) DEVICE — DRAPE ABDOMINAL TIBURON 14X11

## (undated) DEVICE — APPLIER CLIP ENDO LIGAMAX 5MM

## (undated) DEVICE — NDL HYPO REG 25G X 1 1/2

## (undated) DEVICE — TUBING HF INSUFFLATION W/ FLTR

## (undated) DEVICE — DRAPE STERI INSTRUMENT 1018

## (undated) DEVICE — BAG TISS RETRV MONARCH 10MM